# Patient Record
Sex: MALE | Race: BLACK OR AFRICAN AMERICAN | Employment: FULL TIME | URBAN - METROPOLITAN AREA
[De-identification: names, ages, dates, MRNs, and addresses within clinical notes are randomized per-mention and may not be internally consistent; named-entity substitution may affect disease eponyms.]

---

## 2018-01-10 ENCOUNTER — OFFICE VISIT (OUTPATIENT)
Dept: INTERNAL MEDICINE CLINIC | Age: 43
End: 2018-01-10

## 2018-01-10 VITALS
HEART RATE: 82 BPM | BODY MASS INDEX: 25.62 KG/M2 | TEMPERATURE: 97.5 F | HEIGHT: 69 IN | WEIGHT: 173 LBS | DIASTOLIC BLOOD PRESSURE: 88 MMHG | OXYGEN SATURATION: 98 % | SYSTOLIC BLOOD PRESSURE: 123 MMHG | RESPIRATION RATE: 20 BRPM

## 2018-01-10 DIAGNOSIS — E11.9 TYPE 2 DIABETES MELLITUS WITHOUT COMPLICATION, WITHOUT LONG-TERM CURRENT USE OF INSULIN (HCC): ICD-10-CM

## 2018-01-10 DIAGNOSIS — Z23 ENCOUNTER FOR IMMUNIZATION: ICD-10-CM

## 2018-01-10 DIAGNOSIS — F51.04 CHRONIC INSOMNIA: ICD-10-CM

## 2018-01-10 DIAGNOSIS — Z86.69 HISTORY OF SEIZURE DISORDER: ICD-10-CM

## 2018-01-10 DIAGNOSIS — Z00.00 WELL ADULT EXAM: Primary | ICD-10-CM

## 2018-01-10 LAB
GLUCOSE POC: 85 MG/DL
HBA1C MFR BLD HPLC: 11.2 %

## 2018-01-10 RX ORDER — INSULIN PUMP SYRINGE, 3 ML
EACH MISCELLANEOUS
Qty: 1 KIT | Refills: 0 | Status: SHIPPED | OUTPATIENT
Start: 2018-01-10 | End: 2018-03-16 | Stop reason: SDUPTHER

## 2018-01-10 RX ORDER — METFORMIN HYDROCHLORIDE 1000 MG/1
1000 TABLET ORAL 2 TIMES DAILY WITH MEALS
Qty: 180 TAB | Refills: 3 | Status: SHIPPED | OUTPATIENT
Start: 2018-01-10 | End: 2018-11-01 | Stop reason: SDUPTHER

## 2018-01-10 RX ORDER — METFORMIN HYDROCHLORIDE 1000 MG/1
1000 TABLET ORAL 2 TIMES DAILY WITH MEALS
Qty: 60 TAB | Refills: 0 | Status: SHIPPED | OUTPATIENT
Start: 2018-01-10 | End: 2018-01-10 | Stop reason: SDUPTHER

## 2018-01-10 RX ORDER — GUAIFENESIN 100 MG/5ML
81 LIQUID (ML) ORAL DAILY
Qty: 30 TAB | Refills: 0 | Status: SHIPPED | OUTPATIENT
Start: 2018-01-10 | End: 2018-04-13 | Stop reason: SDUPTHER

## 2018-01-10 NOTE — PATIENT INSTRUCTIONS
Insomnia: Care Instructions  Your Care Instructions    Insomnia is the inability to sleep well. It is a common problem for most people at some time. Insomnia may make it hard for you to get to sleep, stay asleep, or sleep as long as you need to. This can make you tired and grouchy during the day. It can also make you forgetful, less effective at work, and unhappy. Insomnia can be caused by conditions such as depression or anxiety. Pain can also affect your ability to sleep. When these problems are solved, the insomnia usually clears up. But sometimes bad sleep habits can cause insomnia. If insomnia is affecting your work or your enjoyment of life, you can take steps to improve your sleep. Follow-up care is a key part of your treatment and safety. Be sure to make and go to all appointments, and call your doctor if you are having problems. It's also a good idea to know your test results and keep a list of the medicines you take. How can you care for yourself at home? What to avoid  · Do not have drinks with caffeine, such as coffee or black tea, for 8 hours before bed. · Do not smoke or use other types of tobacco near bedtime. Nicotine is a stimulant and can keep you awake. · Avoid drinking alcohol late in the evening, because it can cause you to wake in the middle of the night. · Do not eat a big meal close to bedtime. If you are hungry, eat a light snack. · Do not drink a lot of water close to bedtime, because the need to urinate may wake you up during the night. · Do not read or watch TV in bed. Use the bed only for sleeping and sexual activity. What to try  · Go to bed at the same time every night, and wake up at the same time every morning. Do not take naps during the day. · Keep your bedroom quiet, dark, and cool. · Sleep on a comfortable pillow and mattress. · If watching the clock makes you anxious, turn it facing away from you so you cannot see the time.   · If you worry when you lie down, start a worry book. Well before bedtime, write down your worries, and then set the book and your concerns aside. · Try meditation or other relaxation techniques before you go to bed. · If you cannot fall asleep, get up and go to another room until you feel sleepy. Do something relaxing. Repeat your bedtime routine before you go to bed again. · Make your house quiet and calm about an hour before bedtime. Turn down the lights, turn off the TV, log off the computer, and turn down the volume on music. This can help you relax after a busy day. When should you call for help? Watch closely for changes in your health, and be sure to contact your doctor if:  ? · Your efforts to improve your sleep do not work. ? · Your insomnia gets worse. ? · You have been feeling down, depressed, or hopeless or have lost interest in things that you usually enjoy. Where can you learn more? Go to http://brianSwitchable Solutionsjaya.info/. Enter P513 in the search box to learn more about \"Insomnia: Care Instructions. \"  Current as of: July 26, 2016  Content Version: 11.4  © 1976-2082 [employer]. Care instructions adapted under license by [employer] (which disclaims liability or warranty for this information). If you have questions about a medical condition or this instruction, always ask your healthcare professional. [address] any warranty or liability for your use of this information. Learning About Benefits From Quitting Smoking  How does quitting smoking make you healthier? If you're thinking about quitting smoking, you may have a few reasons to be smoke-free. Your health may be one of them. · When you quit smoking, you lower your risks for cancer, lung disease, heart attack, stroke, blood vessel disease, and blindness from macular degeneration. · When you're smoke-free, you get sick less often, and you heal faster.  You are less likely to get colds, flu, bronchitis, and pneumonia. · As a nonsmoker, you may find that your mood is better and you are less stressed. When and how will you feel healthier? Quitting has real health benefits that start from day 1 of being smoke-free. And the longer you stay smoke-free, the healthier you get and the better you feel. The first hours  · After just 20 minutes, your blood pressure and heart rate go down. That means there's less stress on your heart and blood vessels. · Within 12 hours, the level of carbon monoxide in your blood drops back to normal. That makes room for more oxygen. With more oxygen in your body, you may notice that you have more energy than when you smoked. After 2 weeks  · Your lungs start to work better. · Your risk of heart attack starts to drop. After 1 month  · When your lungs are clear, you cough less and breathe deeper, so it's easier to be active. · Your sense of taste and smell return. That means you can enjoy food more than you have since you started smoking. Over the years  · After 1 year, your risk of heart disease is half what it would be if you kept smoking. · After 5 years, your risk of stroke starts to shrink. Within a few years after that, it's about the same as if you'd never smoked. · After 10 years, your risk of dying from lung cancer is cut by about half. And your risk for many other types of cancer is lower too. How would quitting help others in your life? When you quit smoking, you improve the health of everyone who now breathes in your smoke. · Their heart, lung, and cancer risks drop, much like yours. · They are sick less. For babies and small children, living smoke-free means they're less likely to have ear infections, pneumonia, and bronchitis. · If you're a woman who is or will be pregnant someday, quitting smoking means a healthier . · Children who are close to you are less likely to become adult smokers. Where can you learn more?   Go to http://douglas.info/. Enter [id number] [id number] [id number] in the search box to learn more about \"Learning About Benefits From Quitting Smoking. \"  Current as of: March 20, 2017  Content Version: 11.4  © 1663-2622 Healthwise[employer]. Care instructions adapted under license by [employer] (which disclaims liability or warranty for this information). If you have questions about a medical condition or this instruction, always ask your healthcare professional. [place] [address] any warranty or liability for your use of this information.

## 2018-01-10 NOTE — PROGRESS NOTES
1. Have you been to the ER, urgent care clinic since your last visit? Hospitalized since your last visit? No.    2. Have you seen or consulted any other health care providers outside of the [address] [place] [address] since your last visit? Include any pap smears or colon screening. No.  Blood glucose/A1C/Lipidn verbal order DR. Khanna/STEPHON Mayo. Angel Ramos is a 40 y.o. male who presents for routine immunizations. He denies any symptoms , reactions or allergies that would exclude them from being immunized today. Risks and adverse reactions were discussed and the VIS was given to them. All questions were addressed. He was observed for 15 min post injection. There were no reactions observed.     Keyla Ramírez LPN

## 2018-01-10 NOTE — PROGRESS NOTES
Jeff Gottlieb is a 40 y.o. male and presents with Diabetes  . Subjective:    First visit w me. Former Dr. Kathie Werner pts  Last visit in this office 2 years ago. Booked for \"follow-up\". PMH:  Diabetes Mellitus Review:  He has diabetes mellitus. Diabetic ROS - medication compliance: pt has been out of his medication for some time. Pt has no glucometer  Known diabetic complications: none  Cardiovascular risk factors: family history, dyslipidemia, diabetes mellitus,  hypertension  Current diabetic medications include oral agents  Eye exam current (within one year): no  Weight trend: stable  Prior visit with dietician: no  Current diet: \"healthy\" diet  in general  Current exercise: walking  Current monitoring regimen: home blood tests - daily  Home blood sugar records: trend: stable  Any episodes of hypoglycemia? no  Is He on ACE inhibitor or angiotensin II receptor blocker? Yes   Lab Results   Component Value Date/Time    Hemoglobin A1c 7.7 03/20/2009 03:20 AM    Hemoglobin A1c (POC) 11.2 01/10/2018 11:00 AM     Pt w c/o chronic insomnia. Pt works nights. He relays he has had a long-standing h/o insomnia w no relief w ambien. He had a sleep study @ [facility] [place], but has no idea of the results. Pt has a h/o seizure d/o was on keppra. Pt was followed by neurology @ [facility] [place]. Pt has been seizure-free OFF MEDS x ~ 4 yrs. Pt is a smoker. Not currently interested in quitting.   Review of Systems  Constitutional: negative for fevers, chills, anorexia and weight loss  Respiratory:  negative for cough, hemoptysis, dyspnea,wheezing  CV:   negative for chest pain, palpitations, lower extremity edema  GI:   negative for nausea, vomiting, diarrhea, abdominal pain,melena  Musculoskel: negative for myalgias, arthralgias, back pain, muscle weakness, joint pain  Neurological:  negative for headaches, dizziness, vertigo, memory problems and gait   Behavl/Psych: negative for feelings of anxiety    Past Medical History:   Diagnosis Date    Asthma     [facility] ([facility] [address]     Kidney stones     Seizures (HCC)      Past Surgical History:   Procedure Laterality Date    HX OTHER SURGICAL      adenoidectomy     Social History     Social History    Marital status: SINGLE     Spouse name: N/A    Number of children: N/A    Years of education: N/A     Social History Main Topics    Smoking status: Current Every Day Smoker     Packs/day: 0.50     Types: Cigarettes    Smokeless tobacco: Current User    Alcohol use Yes      Comment: seldomly    Drug use: No    Sexual activity: Yes     Partners: Female     Other Topics Concern    None     Social History Narrative     Family History   Problem Relation Age of Onset   Philip Canela Stroke Mother     Heart Disease Mother     Diabetes Mother     High Cholesterol Mother     Hypertension Mother     Thyroid Disease Mother     Hypertension Father     High Cholesterol Father     Diabetes Maternal Grandmother     Hypertension Maternal Grandmother     Arthritis-osteo Maternal Grandmother      Current Outpatient Prescriptions   Medication Sig Dispense Refill    metFORMIN (GLUCOPHAGE) 1,000 mg tablet Take 1 Tab by mouth two (2) times daily (with meals). 180 Tab 3    aspirin 81 mg chewable tablet Take 1 Tab by mouth daily. 30 Tab 0    glucose blood VI test strips (BLOOD GLUCOSE TEST) strip Use BID Dx11.9 200 Strip 5    Blood-Glucose Meter (ONETOUCH ULTRAMINI) monitoring kit Dx 11.9 1 Kit 0    omeprazole (PRILOSEC) 20 mg capsule Take 1 Cap by mouth daily. 30 Cap 11    fish oil-omega-3 fatty acids 340-1,000 mg capsule Take 1 Cap by mouth daily. 30 Cap 11    tamsulosin (FLOMAX) 0.4 mg capsule Take 1 Cap by mouth daily.  To improve urination  Indications: SYMPTOMATIC BENIGN PROSTATIC HYPERPLASIA 30 Cap 2     Allergies   Allergen Reactions    Dilantin Infatabs [Phenytoin] Unknown (comments)       Objective:  Visit Vitals    /88 (BP 1 Location: Left arm, BP Patient Position: Sitting)    Pulse 82    Temp 97.5 °F (36.4 °C) (Oral)    Resp 20    Ht 5' 9\" (1.753 m)    Wt 173 lb (78.5 kg)    SpO2 98%    BMI 25.55 kg/m2     Physical Exam:   General appearance - alert, well appearing, and in no distress  Mental status - alert, oriented to person, place, and time  EYE-EOMI  ENT-ENT exam normal, no neck nodes or sinus tenderness  Nose - normal and patent, no erythema, discharge or polyps  Mouth - mucous membranes moist, pharynx normal without lesions  Neck - supple, no significant adenopathy   Chest - clear to auscultation, no wheezes, rales or rhonchi, symmetric air entry   Heart - normal rate, regular rhythm, normal S1, S2  Abdomen - soft, nontender, nondistended, no masses or organomegaly  Ext-peripheral pulses normal, no pedal edema, no clubbing or cyanosis  Skin-Warm and dry. no hyperpigmentation, vitiligo, or suspicious lesions  Neuro -alert, oriented, normal speech, no focal findings or movement disorder noted      Results for orders placed or performed in visit on 01/10/18   AMB POC GLUCOSE BLOOD, BY GLUCOSE MONITORING DEVICE   Result Value Ref Range    Glucose POC 85 mg/dL   AMB POC HEMOGLOBIN A1C   Result Value Ref Range    Hemoglobin A1c (POC) 11.2 %       Assessment/Plan:    ICD-10-CM ICD-9-CM    1. Well adult exam Z00.00 V70.0 LIPID PANEL      METABOLIC PANEL, COMPREHENSIVE      MICROALBUMIN, UR, RAND      CBC W/O DIFF      PSA, DIAGNOSTIC (PROSTATE SPECIFIC AG)   2.  Type 2 diabetes mellitus without complication, without long-term current use of insulin ([facility]) E11.9 250.00 AMB POC GLUCOSE BLOOD, BY GLUCOSE MONITORING DEVICE      AMB POC HEMOGLOBIN A1C      metFORMIN (GLUCOPHAGE) 1,000 mg tablet      REFERRAL TO OPHTHALMOLOGY      LIPID PANEL      METABOLIC PANEL, COMPREHENSIVE      MICROALBUMIN, UR, RAND      CBC W/O DIFF      PSA, DIAGNOSTIC (PROSTATE SPECIFIC AG)      glucose blood VI test strips (BLOOD GLUCOSE TEST) strip      DISCONTINUED: metFORMIN (GLUCOPHAGE) 1,000 mg tablet      CANCELED: AMB POC LIPID PROFILE   3. History of seizure disorder Z86.69 V12.49    4. Chronic insomnia F51.04 780.52    5. Encounter for immunization Z23 V03.89 PNEUMOCOCCAL POLYSACCHARIDE VACCINE, 23-VALENT, ADULT OR IMMUNOSUPPRESSED PT DOSE,      INFLUENZA VIRUS VAC QUAD,SPLIT,PRESV FREE SYRINGE IM     Orders Placed This Encounter    PNEUMOCOCCAL POLYSACCHARIDE VACCINE, 23-VALENT, ADULT OR IMMUNOSUPPRESSED PT DOSE,    INFLUENZA VIRUS VACCINE QUADRIVALENT, PRESERVATIVE FREE SYRINGE ([id number])    LIPID PANEL    METABOLIC PANEL, COMPREHENSIVE    MICROALBUMIN, UR, RAND    CBC W/O DIFF    PROSTATE SPECIFIC AG    REFERRAL TO OPHTHALMOLOGY     Referral Priority:   Routine     Referral Type:   Consultation     Referral Reason:   Specialty Services Required     Referral Location:   Wang Herndon M.D. Referred to Provider:   Wang Herndon MD     Requested Specialty:   Ophthalmology    AMB POC GLUCOSE BLOOD, BY GLUCOSE MONITORING DEVICE    AMB POC HEMOGLOBIN A1C    DISCONTD: metFORMIN (GLUCOPHAGE) 1,000 mg tablet     Sig: Take 1 Tab by mouth two (2) times daily (with meals). Dispense:  60 Tab     Refill:  0    metFORMIN (GLUCOPHAGE) 1,000 mg tablet     Sig: Take 1 Tab by mouth two (2) times daily (with meals). Dispense:  180 Tab     Refill:  3    aspirin 81 mg chewable tablet     Sig: Take 1 Tab by mouth daily. Dispense:  30 Tab     Refill:  0    glucose blood VI test strips (BLOOD GLUCOSE TEST) strip     Sig: Use BID Dx11.9     Dispense:  200 Strip     Refill:  5    Blood-Glucose Meter (ONETOUCH ULTRAMINI) monitoring kit     Sig: Dx 11.9     Dispense:  1 Kit     Refill:  0   refill metformin  stop smoking (advice and handout given), routine labs ordered  ,Take 81mg aspirin daily   Review [facility] chart  Refill medication  Address insomnia NV  Patient Instructions          Insomnia: Care Instructions  Your Care Instructions    Insomnia is the inability to sleep well.  It is a common problem for most people at some time. Insomnia may make it hard for you to get to sleep, stay asleep, or sleep as long as you need to. This can make you tired and grouchy during the day. It can also make you forgetful, less effective at work, and unhappy. Insomnia can be caused by conditions such as depression or anxiety. Pain can also affect your ability to sleep. When these problems are solved, the insomnia usually clears up. But sometimes bad sleep habits can cause insomnia. If insomnia is affecting your work or your enjoyment of life, you can take steps to improve your sleep. Follow-up care is a key part of your treatment and safety. Be sure to make and go to all appointments, and call your doctor if you are having problems. It's also a good idea to know your test results and keep a list of the medicines you take. How can you care for yourself at home? What to avoid  · Do not have drinks with caffeine, such as coffee or black tea, for 8 hours before bed. · Do not smoke or use other types of tobacco near bedtime. Nicotine is a stimulant and can keep you awake. · Avoid drinking alcohol late in the evening, because it can cause you to wake in the middle of the night. · Do not eat a big meal close to bedtime. If you are hungry, eat a light snack. · Do not drink a lot of water close to bedtime, because the need to urinate may wake you up during the night. · Do not read or watch TV in bed. Use the bed only for sleeping and sexual activity. What to try  · Go to bed at the same time every night, and wake up at the same time every morning. Do not take naps during the day. · Keep your bedroom quiet, dark, and cool. · Sleep on a comfortable pillow and mattress. · If watching the clock makes you anxious, turn it facing away from you so you cannot see the time. · If you worry when you lie down, start a worry book. Well before bedtime, write down your worries, and then set the book and your concerns aside.   · Try meditation or other relaxation techniques before you go to bed. · If you cannot fall asleep, get up and go to another room until you feel sleepy. Do something relaxing. Repeat your bedtime routine before you go to bed again. · Make your house quiet and calm about an hour before bedtime. Turn down the lights, turn off the TV, log off the computer, and turn down the volume on music. This can help you relax after a busy day. When should you call for help? Watch closely for changes in your health, and be sure to contact your doctor if:  ? · Your efforts to improve your sleep do not work. ? · Your insomnia gets worse. ? · You have been feeling down, depressed, or hopeless or have lost interest in things that you usually enjoy. Where can you learn more? Go to http://brian-jaya.info/. Enter P513 in the search box to learn more about \"Insomnia: Care Instructions. \"  Current as of: July 26, 2016  Content Version: 11.4  © 1659-0244 [employer]. Care instructions adapted under license by [employer] (which disclaims liability or warranty for this information). If you have questions about a medical condition or this instruction, always ask your healthcare professional. [address] any warranty or liability for your use of this information. Learning About Benefits From Quitting Smoking  How does quitting smoking make you healthier? If you're thinking about quitting smoking, you may have a few reasons to be smoke-free. Your health may be one of them. · When you quit smoking, you lower your risks for cancer, lung disease, heart attack, stroke, blood vessel disease, and blindness from macular degeneration. · When you're smoke-free, you get sick less often, and you heal faster. You are less likely to get colds, flu, bronchitis, and pneumonia. · As a nonsmoker, you may find that your mood is better and you are less stressed.   When and how will you feel healthier? Quitting has real health benefits that start from day 1 of being smoke-free. And the longer you stay smoke-free, the healthier you get and the better you feel. The first hours  · After just 20 minutes, your blood pressure and heart rate go down. That means there's less stress on your heart and blood vessels. · Within 12 hours, the level of carbon monoxide in your blood drops back to normal. That makes room for more oxygen. With more oxygen in your body, you may notice that you have more energy than when you smoked. After 2 weeks  · Your lungs start to work better. · Your risk of heart attack starts to drop. After 1 month  · When your lungs are clear, you cough less and breathe deeper, so it's easier to be active. · Your sense of taste and smell return. That means you can enjoy food more than you have since you started smoking. Over the years  · After 1 year, your risk of heart disease is half what it would be if you kept smoking. · After 5 years, your risk of stroke starts to shrink. Within a few years after that, it's about the same as if you'd never smoked. · After 10 years, your risk of dying from lung cancer is cut by about half. And your risk for many other types of cancer is lower too. How would quitting help others in your life? When you quit smoking, you improve the health of everyone who now breathes in your smoke. · Their heart, lung, and cancer risks drop, much like yours. · They are sick less. For babies and small children, living smoke-free means they're less likely to have ear infections, pneumonia, and bronchitis. · If you're a woman who is or will be pregnant someday, quitting smoking means a healthier . · Children who are close to you are less likely to become adult smokers. Where can you learn more? Go to http://brian-jaya.info/. Enter [id number] in the search box to learn more about \"Learning About Benefits From Quitting Smoking. \"  Current as of: March 20, 2017  Content Version: 11.4  © 8094-4181 [employer], [employer]. Care instructions adapted under license by [employer] (which disclaims liability or warranty for this information). If you have questions about a medical condition or this instruction, always ask your healthcare professional. [address] any warranty or liability for your use of this information. Follow-up Disposition:  Return in about 3 months (around 4/10/2018). I have reviewed with the patient details of the assessment and plan and all questions were answered. Relevent patient education was performed. The most recent lab findings were reviewed with the patient. An After Visit Summary was printed and given to the patient.

## 2018-01-10 NOTE — MR AVS SNAPSHOT
Visit Information Date & Time Provider Department Dept. Phone Encounter #  
 1/10/2018 11:20 AM Jolie Pizano, [address] [id number] Follow-up Instructions Return in about 3 months (around 4/10/2018). Your Appointments 1/10/2018 11:20 AM  
ROUTINE CARE with Jolie Pizano MD  
[facility] Appt Note: follow up on DM and A1C, med refills [place] [address] [facility] [id number]  
[phone]  
  
   
 [place] [address] Upcoming Health Maintenance Date Due Pneumococcal 19-64 Medium Risk (1 of 1 - PPSV23) 11/17/1995 DTaP/Tdap/Td series (1 - Tdap) 11/17/1997 EYE EXAM RETINAL OR DILATED Q1 10/13/2015 HEMOGLOBIN A1C Q6M 6/4/2016 FOOT EXAM Q1 12/4/2016 MICROALBUMIN Q1 12/4/2016 LIPID PANEL Q1 12/4/2016 Influenza Age 5 to Adult 8/1/2017 Allergies as of 1/10/2018  Review Complete On: 1/10/2018 By: Jolie Pizano MD  
  
 Severity Noted Reaction Type Reactions Dilantin Infatabs [Phenytoin]  02/19/2013    Unknown (comments) Current Immunizations  Reviewed on 10/13/2014 Name Date Influenza Vaccine (Quad) PF  Incomplete Influenza Vaccine Intradermal PF 10/13/2014 Pneumococcal Polysaccharide (PPSV-23)  Incomplete Not reviewed this visit You Were Diagnosed With   
  
 Codes Comments Well adult exam    -  Primary ICD-10-CM: Z00.00 ICD-9-CM: V70.0 Type 2 diabetes mellitus without complication, without long-term current use of insulin (HCC)     ICD-10-CM: E11.9 ICD-9-CM: 250.00 History of seizure disorder     ICD-10-CM: Z86.69 
ICD-9-CM: V12.49 Chronic insomnia     ICD-10-CM: F51.04 
ICD-9-CM: 780.52 Encounter for immunization     ICD-10-CM: J20 ICD-9-CM: V03.89 Vitals BP Pulse Temp Resp Height(growth percentile) Weight(growth percentile) 123/88 (BP 1 Location: Left arm, BP Patient Position: Sitting) 82 97.5 °F (36.4 °C) (Oral) 20 5' 9\" (1.753 m) 173 lb (78.5 kg) SpO2 BMI Smoking Status 98% 25.55 kg/m2 Current Every Day Smoker Vitals History BMI and BSA Data Body Mass Index Body Surface Area 25.55 kg/m 2 1.95 m 2 Preferred Pharmacy Pharmacy Name Phone [address] [facility] [phone] Your Updated Medication List  
  
   
This list is accurate as of: 1/10/18 11:17 AM.  Always use your most recent med list.  
  
  
  
  
 aspirin 81 mg chewable tablet Take 1 Tab by mouth daily. Blood-Glucose Meter monitoring kit Commonly known as:  Kristin Johnson Dx 11.9 fish oil-omega-3 fatty acids 340-1,000 mg capsule Take 1 Cap by mouth daily. fluconazole 150 mg tablet Commonly known as:  DIFLUCAN  
1 pill weekly for 6 weeks  
  
 glucose blood VI test strips strip Commonly known as:  blood glucose test  
Use BID Dx11.9  
  
 metFORMIN 1,000 mg tablet Commonly known as:  GLUCOPHAGE Take 1 Tab by mouth two (2) times daily (with meals). omeprazole 20 mg capsule Commonly known as:  PRILOSEC Take 1 Cap by mouth daily. tamsulosin 0.4 mg capsule Commonly known as:  FLOMAX Take 1 Cap by mouth daily. To improve urination  Indications: SYMPTOMATIC BENIGN PROSTATIC HYPERPLASIA Prescriptions Sent to Pharmacy Refills  
 metFORMIN (GLUCOPHAGE) 1,000 mg tablet 3 Sig: Take 1 Tab by mouth two (2) times daily (with meals). Class: Normal  
 Pharmacy: [address], [address] Ph #: [phone] Route: Oral  
 aspirin 81 mg chewable tablet 0 Sig: Take 1 Tab by mouth daily. Class: Normal  
 Pharmacy: [address], [address] Ph #: [phone]  Route: Oral  
 glucose blood VI test strips (BLOOD GLUCOSE TEST) strip 5 Sig: Use BID Dx11.9 Class: Normal  
 Pharmacy: [address], [address] Ph #: [phone] Blood-Glucose Meter (ONETOUCH ULTRAMINI) monitoring kit 0 Sig: Dx 11.9 Class: Normal  
 Pharmacy: [address], [address] Ph #: [phone] We Performed the Following AMB POC GLUCOSE BLOOD, BY GLUCOSE MONITORING DEVICE [21263 CPT(R)] AMB POC HEMOGLOBIN A1C [87790 CPT(R)] AMB POC LIPID PROFILE [42169 CPT(R)] CBC W/O DIFF [23796 CPT(R)] INFLUENZA VIRUS VAC QUAD,SPLIT,PRESV FREE SYRINGE IM [id number] CPT(R)] LIPID PANEL [02749 CPT(R)] METABOLIC PANEL, COMPREHENSIVE [45568 CPT(R)] MICROALBUMIN, UR, RAND [id number] CPT(R)] PNEUMOCOCCAL POLYSACCHARIDE VACCINE, 23-VALENT, ADULT OR IMMUNOSUPPRESSED PT DOSE, [75576 CPT(R)] PSA, DIAGNOSTIC (PROSTATE SPECIFIC AG) [id number] CPT(R)] REFERRAL TO OPHTHALMOLOGY [REF57 Custom] Comments:  
 Dr. Riley [place] [address] 
[phone] Follow-up Instructions Return in about 3 months (around 4/10/2018). Referral Information Referral ID Referred By Referred To  
  
 [id number] Harvey Vincent M.D.   
   [facility] [address] [place], [address] [facility] Visits Status Start Date End Date 1 New Request 1/10/18 1/10/19 If your referral has a status of pending review or denied, additional information will be sent to support the outcome of this decision. Patient Instructions Insomnia: Care Instructions Your Care Instructions Insomnia is the inability to sleep well. It is a common problem for most people at some time. Insomnia may make it hard for you to get to sleep, stay asleep, or sleep as long as you need to.  This can make you tired and grouchy during the day. It can also make you forgetful, less effective at work, and unhappy. Insomnia can be caused by conditions such as depression or anxiety. Pain can also affect your ability to sleep. When these problems are solved, the insomnia usually clears up. But sometimes bad sleep habits can cause insomnia. If insomnia is affecting your work or your enjoyment of life, you can take steps to improve your sleep. Follow-up care is a key part of your treatment and safety. Be sure to make and go to all appointments, and call your doctor if you are having problems. It's also a good idea to know your test results and keep a list of the medicines you take. How can you care for yourself at home? What to avoid · Do not have drinks with caffeine, such as coffee or black tea, for 8 hours before bed. · Do not smoke or use other types of tobacco near bedtime. Nicotine is a stimulant and can keep you awake. · Avoid drinking alcohol late in the evening, because it can cause you to wake in the middle of the night. · Do not eat a big meal close to bedtime. If you are hungry, eat a light snack. · Do not drink a lot of water close to bedtime, because the need to urinate may wake you up during the night. · Do not read or watch TV in bed. Use the bed only for sleeping and sexual activity. What to try · Go to bed at the same time every night, and wake up at the same time every morning. Do not take naps during the day. · Keep your bedroom quiet, dark, and cool. · Sleep on a comfortable pillow and mattress. · If watching the clock makes you anxious, turn it facing away from you so you cannot see the time. · If you worry when you lie down, start a worry book. Well before bedtime, write down your worries, and then set the book and your concerns aside. · Try meditation or other relaxation techniques before you go to bed.  
· If you cannot fall asleep, get up and go to another room until you feel sleepy. Do something relaxing. Repeat your bedtime routine before you go to bed again. · Make your house quiet and calm about an hour before bedtime. Turn down the lights, turn off the TV, log off the computer, and turn down the volume on music. This can help you relax after a busy day. When should you call for help? Watch closely for changes in your health, and be sure to contact your doctor if: 
? · Your efforts to improve your sleep do not work. ? · Your insomnia gets worse. ? · You have been feeling down, depressed, or hopeless or have lost interest in things that you usually enjoy. Where can you learn more? Go to http://brian-jaya.info/. Enter P513 in the search box to learn more about \"Insomnia: Care Instructions. \" Current as of: July 26, 2016 Content Version: 11.4 © 4590-3656 [employer]. Care instructions adapted under license by "[employer] (which disclaims liability or warranty for this information). If you have questions about a medical condition or this instruction, always ask your healthcare professional. [address] any warranty or liability for your use of this information. Learning About Benefits From Quitting Smoking How does quitting smoking make you healthier? If you're thinking about quitting smoking, you may have a few reasons to be smoke-free. Your health may be one of them. · When you quit smoking, you lower your risks for cancer, lung disease, heart attack, stroke, blood vessel disease, and blindness from macular degeneration. · When you're smoke-free, you get sick less often, and you heal faster. You are less likely to get colds, flu, bronchitis, and pneumonia. · As a nonsmoker, you may find that your mood is better and you are less stressed. When and how will you feel healthier?  
Quitting has real health benefits that start from day 1 of being smoke-free. And the longer you stay smoke-free, the healthier you get and the better you feel. The first hours · After just 20 minutes, your blood pressure and heart rate go down. That means there's less stress on your heart and blood vessels. · Within 12 hours, the level of carbon monoxide in your blood drops back to normal. That makes room for more oxygen. With more oxygen in your body, you may notice that you have more energy than when you smoked. After 2 weeks · Your lungs start to work better. · Your risk of heart attack starts to drop. After 1 month · When your lungs are clear, you cough less and breathe deeper, so it's easier to be active. · Your sense of taste and smell return. That means you can enjoy food more than you have since you started smoking. Over the years · After 1 year, your risk of heart disease is half what it would be if you kept smoking. · After 5 years, your risk of stroke starts to shrink. Within a few years after that, it's about the same as if you'd never smoked. · After 10 years, your risk of dying from lung cancer is cut by about half. And your risk for many other types of cancer is lower too. How would quitting help others in your life? When you quit smoking, you improve the health of everyone who now breathes in your smoke. · Their heart, lung, and cancer risks drop, much like yours. · They are sick less. For babies and small children, living smoke-free means they're less likely to have ear infections, pneumonia, and bronchitis. · If you're a woman who is or will be pregnant someday, quitting smoking means a healthier . · Children who are close to you are less likely to become adult smokers. Where can you learn more? Go to http://brian-jaya.info/. Enter [id number] [id number] in the search box to learn more about \"Learning About Benefits From Quitting Smoking. \" Current as of: 2017 Content Version: 11.4 © 2345-8748 [employer]. Care instructions adapted under license by [employer] (which disclaims liability or warranty for this information). If you have questions about a medical condition or this instruction, always ask your healthcare professional. [address] any warranty or liability for your use of this information. Introducing [address]! Miah Lock introduces Qwilr patient portal. Now you can access parts of your medical record, email your doctor's office, and request medication refills online. 1. In your internet browser, go to [email or web address]/[email or web address] 2. Click on the First Time User? Click Here link in the Sign In box. You will see the New Member Sign Up page. 3. Enter your Qwilr Access Code exactly as it appears below. You will not need to use this code after youve completed the sign-up process. If you do not sign up before the expiration date, you must request a new code. · Qwilr Access Code: [email or web address] Expires: 4/10/2018 10:29 AM 
 
4. Enter the last four digits of your Social Security Number (xxxx) and Date of Birth (mm/dd/yyyy) as indicated and click Submit. You will be taken to the next sign-up page. 5. Create a Qwilr ID. This will be your Qwilr login ID and cannot be changed, so think of one that is secure and easy to remember. 6. Create a Qwilr password. You can change your password at any time. 7. Enter your Password Reset Question and Answer. This can be used at a later time if you forget your password. 8. Enter your e-mail address. You will receive e-mail notification when new information is available in [address] 9. Click Sign Up. You can now view and download portions of your medical record. 10. Click the Download Summary menu link to download a portable copy of your medical information.  
 
If you have questions, please visit the Frequently Asked Questions section of the [employer]. Remember, [employer] is NOT to be used for urgent needs. For medical emergencies, dial 911. Now available from your iPhone and Android! Please provide this summary of care documentation to your next provider. Your primary care clinician is listed as Teri Grayson. If you have any questions after today's visit, please call [phone].

## 2018-01-11 LAB
ALBUMIN SERPL-MCNC: 4.4 G/DL (ref 3.5–5.5)
ALBUMIN/GLOB SERPL: 1.5 {RATIO} (ref 1.2–2.2)
ALP SERPL-CCNC: 113 IU/L (ref 39–117)
ALT SERPL-CCNC: 32 IU/L (ref 0–44)
AST SERPL-CCNC: 23 IU/L (ref 0–40)
BILIRUB SERPL-MCNC: 0.5 MG/DL (ref 0–1.2)
BUN SERPL-MCNC: 10 MG/DL (ref 6–24)
BUN/CREAT SERPL: 11 (ref 9–20)
CALCIUM SERPL-MCNC: 9.6 MG/DL (ref 8.7–10.2)
CHLORIDE SERPL-SCNC: 94 MMOL/L (ref 96–106)
CHOLEST SERPL-MCNC: 208 MG/DL (ref 100–199)
CO2 SERPL-SCNC: 26 MMOL/L (ref 18–29)
CREAT SERPL-MCNC: 0.91 MG/DL (ref 0.76–1.27)
ERYTHROCYTE [DISTWIDTH] IN BLOOD BY AUTOMATED COUNT: 13.5 % (ref 12.3–15.4)
GLOBULIN SER CALC-MCNC: 2.9 G/DL (ref 1.5–4.5)
GLUCOSE SERPL-MCNC: 270 MG/DL (ref 65–99)
HCT VFR BLD AUTO: 45.8 % (ref 37.5–51)
HDLC SERPL-MCNC: 43 MG/DL
HGB BLD-MCNC: 15.7 G/DL (ref 13–17.7)
INTERPRETATION, 910389: NORMAL
LDLC SERPL CALC-MCNC: 151 MG/DL (ref 0–99)
Lab: NORMAL
MCH RBC QN AUTO: 31.2 PG (ref 26.6–33)
MCHC RBC AUTO-ENTMCNC: 34.3 G/DL (ref 31.5–35.7)
MCV RBC AUTO: 91 FL (ref 79–97)
MICROALBUMIN UR-MCNC: 15.7 UG/ML
PLATELET # BLD AUTO: 329 X10E3/UL (ref 150–379)
POTASSIUM SERPL-SCNC: 4.2 MMOL/L (ref 3.5–5.2)
PROT SERPL-MCNC: 7.3 G/DL (ref 6–8.5)
PSA SERPL-MCNC: 0.5 NG/ML (ref 0–4)
RBC # BLD AUTO: 5.03 X10E6/UL (ref 4.14–5.8)
SODIUM SERPL-SCNC: 137 MMOL/L (ref 134–144)
TRIGL SERPL-MCNC: 68 MG/DL (ref 0–149)
VLDLC SERPL CALC-MCNC: 14 MG/DL (ref 5–40)
WBC # BLD AUTO: 7 X10E3/UL (ref 3.4–10.8)

## 2018-03-16 ENCOUNTER — OFFICE VISIT (OUTPATIENT)
Dept: INTERNAL MEDICINE CLINIC | Age: 43
End: 2018-03-16

## 2018-03-16 VITALS
HEIGHT: 69 IN | BODY MASS INDEX: 25.52 KG/M2 | SYSTOLIC BLOOD PRESSURE: 124 MMHG | HEART RATE: 102 BPM | TEMPERATURE: 98.6 F | OXYGEN SATURATION: 94 % | WEIGHT: 172.3 LBS | RESPIRATION RATE: 18 BRPM | DIASTOLIC BLOOD PRESSURE: 84 MMHG

## 2018-03-16 DIAGNOSIS — R10.9 FLANK PAIN: ICD-10-CM

## 2018-03-16 DIAGNOSIS — J06.9 VIRAL UPPER RESPIRATORY ILLNESS: Primary | ICD-10-CM

## 2018-03-16 DIAGNOSIS — E11.9 TYPE 2 DIABETES MELLITUS WITHOUT COMPLICATION, WITHOUT LONG-TERM CURRENT USE OF INSULIN (HCC): ICD-10-CM

## 2018-03-16 RX ORDER — LANCETS
EACH MISCELLANEOUS
Qty: 200 EACH | Refills: 5 | Status: SHIPPED | OUTPATIENT
Start: 2018-03-16

## 2018-03-16 RX ORDER — HYDROCODONE BITARTRATE AND ACETAMINOPHEN 5; 325 MG/1; MG/1
TABLET ORAL
COMMUNITY
Start: 2018-01-16 | End: 2018-03-16

## 2018-03-16 RX ORDER — INSULIN PUMP SYRINGE, 3 ML
EACH MISCELLANEOUS
Qty: 1 KIT | Refills: 0 | Status: SHIPPED | OUTPATIENT
Start: 2018-03-16 | End: 2019-04-15

## 2018-03-16 NOTE — PROGRESS NOTES
Pt is here for   Chief Complaint   Patient presents with   Rivas Roberts  [facility] for Virus 3/14/18, pt states he wasd told it wasnt the flu    Generalized Body Aches     pt states he still has body aches, sore to the touch, but mostly lower back pains and kidney area pains    New Order     pt states that he would [facility] a order for a meter, lancets, and test strips         Pt states pain level is a 9 all over body pain, and flank pains    1. Have you been to the ER, urgent care clinic since your last visit? Hospitalized since your last visit? No    2. Have you seen or consulted any other health care providers outside of the [address] [place] [address] since your last visit? Include any pap smears or colon screening.  No

## 2018-03-16 NOTE — PROGRESS NOTES
Willem Fernandez is a 40 y.o. male and presents with ED Follow-up [facility] for Virus 3/14/18, pt states he wasd told it wasnt the flu); Generalized Body Aches (pt states he still has body aches, sore to the touch, but mostly lower back pains and kidney area pains); and New Order (pt states that he would [facility] a order for a meter, lancets, and test strips)  . Subjective:    Pt was seen @ [place] ED 2-3 days ago for flu-like sxs. Pt was dx'ed w viral syndrome--otc symptomatic treatment only. Pt comes-in w c/o anjel lbp and is concerned re: kidneys. But pt cannot produce urine to check  Results for orders placed or performed in visit on 12/04/15   AMB POC URINALYSIS DIP STICK AUTO W/ MICRO     Status: None   Result Value Ref Range Status    Color (UA POC) Lilibeth  Final    Clarity (UA POC) Clear  Final    Glucose (UA POC) 1+ Negative Final    Bilirubin (UA POC) Negative Negative Final    Ketones (UA POC) Negative Negative Final    Specific gravity (UA POC) 1.020 1.001 - 1.035 Final    Blood (UA POC) Trace Negative Final     Comment: intact    pH (UA POC) 6.5 4.6 - 8.0 Final    Protein (UA POC) 1+ Negative mg/dL Final    Urobilinogen (UA POC) 1 mg/dL 0.2 - 1 Final    Nitrites (UA POC) Negative Negative Final    Leukocyte esterase (UA POC) Negative Negative Final                 Review of Systems  Constitutional: positive for fevers, chills, anorexia   Eyes:   negative for visual disturbance and irritation  ENT:   negative for tinnitus,sore throat,nasal congestion,ear pains. hoarseness  Respiratory:  negative for cough, hemoptysis, dyspnea,wheezing  CV:   negative for chest pain, palpitations, lower extremity edema  GI:   positive for nausea, vomiting, diarrhea, abdominal pain  Endo:               negative for polyuria,polydipsia,polyphagia,heat intolerance  Genitourinary: negative for frequency, dysuria and hematuria  Musculoskel: positive for myalgias, arthralgias, back pain, muscle weakness, joint pain Neurological:  negative for headaches, dizziness, vertigo, memory problems and gait   Behavl/Psych: negative for feelings of anxiety, depression, mood changes    Past Medical History:   Diagnosis Date    Asthma     Diabetes ([facility]     Kidney stones     Seizures ([facility]      Past Surgical History:   Procedure Laterality Date    HX OTHER SURGICAL      adenoidectomy     Social History     Social History    Marital status: SINGLE     Spouse name: N/A    Number of children: N/A    Years of education: N/A     Social History Main Topics    Smoking status: Current Every Day Smoker     Packs/day: 0.50     Types: Cigarettes    Smokeless tobacco: Current User    Alcohol use Yes      Comment: seldomly    Drug use: No    Sexual activity: Yes     Partners: Female     Other Topics Concern    None     Social History Narrative     Family History   Problem Relation Age of Onset    Stroke Mother     Heart Disease Mother     Diabetes Mother     High Cholesterol Mother     Hypertension Mother     Thyroid Disease Mother     Hypertension Father     High Cholesterol Father     Diabetes Maternal Grandmother     Hypertension Maternal Grandmother     Arthritis-osteo Maternal Grandmother      Current Outpatient Prescriptions   Medication Sig Dispense Refill    Blood-Glucose Meter (ONETOUCH ULTRAMINI) monitoring kit Dx E11.9 1 Kit 0    glucose blood VI test strips (BLOOD GLUCOSE TEST) strip Use BID DxE11.9 200 Strip 5    Lancets misc Use as directed. Dx: E11.9 200 Each 5    metFORMIN (GLUCOPHAGE) 1,000 mg tablet Take 1 Tab by mouth two (2) times daily (with meals). 180 Tab 3    aspirin 81 mg chewable tablet Take 1 Tab by mouth daily. 30 Tab 0    omeprazole (PRILOSEC) 20 mg capsule Take 1 Cap by mouth daily. 30 Cap 11    fish oil-omega-3 fatty acids 340-1,000 mg capsule Take 1 Cap by mouth daily. 30 Cap 11    tamsulosin (FLOMAX) 0.4 mg capsule Take 1 Cap by mouth daily.  To improve urination  Indications: SYMPTOMATIC BENIGN PROSTATIC HYPERPLASIA 30 Cap 2     Allergies   Allergen Reactions    Dilantin Infatabs [Phenytoin] Unknown (comments)       Objective:  Visit Vitals    /84 (BP 1 Location: Right arm, BP Patient Position: Sitting)    Pulse (!) 102    Temp 98.6 °F (37 °C) (Oral)    Resp 18    Ht 5' 9\" (1.753 m)    Wt 172 lb 4.8 oz (78.2 kg)    SpO2 94%    BMI 25.44 kg/m2     Physical Exam:   General appearance - alert, appears tired,nasal voice due to congestion  Mental status - alert, oriented to person, place, and time  Mouth - mucous membranes moist, pharynx normal without lesions  Neck - supple, no significant adenopathy   Chest - clear to auscultation, no wheezes, rales or rhonchi, symmetric air entry   Heart - normal rate, regular rhythm, normal S1, S2  Abdomen - soft, nontender, nondistended, no masses or organomegaly  Ext-peripheral pulses normal, no pedal edema, no clubbing or cyanosis  Back-+ paraspinal tenderness  Skin-Warm and dry. no hyperpigmentation, vitiligo, or suspicious lesions  Neuro -alert, oriented, normal speech, no focal findings or movement disorder noted        Results for orders placed or performed in visit on 11/65/47   METABOLIC PANEL, COMPREHENSIVE   Result Value Ref Range    Glucose 270 (H) 65 - 99 mg/dL    BUN 10 6 - 24 mg/dL    Creatinine 0.91 0.76 - 1.27 mg/dL    GFR est non- >59 mL/min/1.73    GFR est  >59 mL/min/1.73    BUN/Creatinine ratio 11 9 - 20    Sodium 137 134 - 144 mmol/L    Potassium 4.2 3.5 - 5.2 mmol/L    Chloride 94 (L) 96 - 106 mmol/L    CO2 26 18 - 29 mmol/L    Calcium 9.6 8.7 - 10.2 mg/dL    Protein, total 7.3 6.0 - 8.5 g/dL    Albumin 4.4 3.5 - 5.5 g/dL    GLOBULIN, TOTAL 2.9 1.5 - 4.5 g/dL    A-G Ratio 1.5 1.2 - 2.2    Bilirubin, total 0.5 0.0 - 1.2 mg/dL    Alk.  phosphatase 113 39 - 117 IU/L    AST (SGOT) 23 0 - 40 IU/L    ALT (SGPT) 32 0 - 44 IU/L   CBC W/O DIFF   Result Value Ref Range    WBC 7.0 3.4 - 10.8 x10E3/uL    RBC 5.03 4.14 - 5.80 x10E6/uL    HGB 15.7 13.0 - 17.7 g/dL    HCT 45.8 37.5 - 51.0 %    MCV 91 79 - 97 fL    MCH 31.2 26.6 - 33.0 pg    MCHC 34.3 31.5 - 35.7 g/dL    RDW 13.5 12.3 - 15.4 %    PLATELET 553 858 - 613 x10E3/uL   LIPID PANEL   Result Value Ref Range    Cholesterol, total 208 (H) 100 - 199 mg/dL    Triglyceride 68 0 - 149 mg/dL    HDL Cholesterol 43 >39 mg/dL    VLDL, calculated 14 5 - 40 mg/dL    LDL, calculated 151 (H) 0 - 99 mg/dL   PSA, DIAGNOSTIC (PROSTATE SPECIFIC AG)   Result Value Ref Range    Prostate Specific Ag 0.5 0.0 - 4.0 ng/mL   MICROALBUMIN, UR, RAND   Result Value Ref Range    Microalbumin, urine 15.7 Not Estab. ug/mL   CVD REPORT   Result Value Ref Range    INTERPRETATION Note    DIABETES PATIENT EDUCATION   Result Value Ref Range    PDF Image Not applicable    AMB POC GLUCOSE BLOOD, BY GLUCOSE MONITORING DEVICE   Result Value Ref Range    Glucose POC 85 mg/dL   AMB POC HEMOGLOBIN A1C   Result Value Ref Range    Hemoglobin A1c (POC) 11.2 %       Assessment/Plan:    ICD-10-CM ICD-9-CM    1. Viral upper respiratory illness J06.9 465.9     B97.89     2. Flank pain R10.9 789.09 AMB POC URINALYSIS DIP STICK AUTO W/O MICRO   3. Type 2 diabetes mellitus without complication, without long-term current use of insulin (HCC) E11.9 250.00 Blood-Glucose Meter (ONETOUCH ULTRAMINI) monitoring kit      glucose blood VI test strips (BLOOD GLUCOSE TEST) strip      Lancets misc     Orders Placed This Encounter    AMB POC URINALYSIS DIP STICK AUTO W/O MICRO    DISCONTD: HYDROcodone-acetaminophen (NORCO) 5-325 mg per tablet    Blood-Glucose Meter (ONETOUCH ULTRAMINI) monitoring kit     Sig: Dx E11.9     Dispense:  1 Kit     Refill:  0    glucose blood VI test strips (BLOOD GLUCOSE TEST) strip     Sig: Use BID DxE11.9     Dispense:  200 Strip     Refill:  5    Lancets misc     Sig: Use as directed.  Dx: E11.9     Dispense:  200 Each     Refill:  5     Pt left w/o giving a urine sample  There are no Patient Instructions on file for this visit. Follow-up Disposition:  Return for routine/prn. I have reviewed with the patient details of the assessment and plan and all questions were answered. Relevent patient education was performed. The most recent lab findings were reviewed with the patient. An After Visit Summary was printed and given to the patient.

## 2018-09-11 ENCOUNTER — OFFICE VISIT (OUTPATIENT)
Dept: INTERNAL MEDICINE CLINIC | Age: 43
End: 2018-09-11

## 2018-09-11 VITALS
DIASTOLIC BLOOD PRESSURE: 81 MMHG | WEIGHT: 177 LBS | SYSTOLIC BLOOD PRESSURE: 131 MMHG | HEIGHT: 69 IN | BODY MASS INDEX: 26.22 KG/M2 | RESPIRATION RATE: 18 BRPM | HEART RATE: 104 BPM | TEMPERATURE: 102.6 F | OXYGEN SATURATION: 99 %

## 2018-09-11 DIAGNOSIS — J02.9 ACUTE PHARYNGITIS, UNSPECIFIED ETIOLOGY: Primary | ICD-10-CM

## 2018-09-11 DIAGNOSIS — E11.9 TYPE 2 DIABETES MELLITUS WITHOUT COMPLICATION, WITHOUT LONG-TERM CURRENT USE OF INSULIN (HCC): ICD-10-CM

## 2018-09-11 LAB
GLUCOSE POC: 119 MG/DL
HBA1C MFR BLD HPLC: 7.3 %

## 2018-09-11 RX ORDER — AZITHROMYCIN 250 MG/1
TABLET, FILM COATED ORAL
Qty: 6 TAB | Refills: 0 | Status: SHIPPED | OUTPATIENT
Start: 2018-09-11 | End: 2019-04-15 | Stop reason: ALTCHOICE

## 2018-09-11 NOTE — PROGRESS NOTES
Damari Gallo is a 40 y.o. male and presents with Sore Throat Lila Calixto Subjective: Pt w c/o few days tactile fever, sore throat, arthralgias, malaise and tender ln neck area. No known ill contact. No neck stiffness or photophobia No n/v/abd pain/rashes/recent insect bites. Lila Calixto Pt is tolerating PO fluids and food. Rapid strep neg Review of Systems Review of systems (12) negative, except noted above. Past Medical History:  
Diagnosis Date  Asthma  Diabetes ([facility]  Kidney stones  Seizures ([facility] Past Surgical History:  
Procedure Laterality Date  HX OTHER SURGICAL    
 adenoidectomy Social History Social History  Marital status: SINGLE Spouse name: N/A  
 Number of children: N/A  
 Years of education: N/A Social History Main Topics  Smoking status: Current Every Day Smoker Packs/day: 0.50 Types: Cigarettes  Smokeless tobacco: Current User  Alcohol use Yes Comment: seldomly  Drug use: No  
 Sexual activity: Yes  
  Partners: Female Other Topics Concern  None Social History Narrative Family History Problem Relation Age of Onset  Stroke Mother  Heart Disease Mother  Diabetes Mother  High Cholesterol Mother  Hypertension Mother  Thyroid Disease Mother  Hypertension Father  High Cholesterol Father  Diabetes Maternal Grandmother  Hypertension Maternal Grandmother  Arthritis-osteo Maternal Grandmother Current Outpatient Prescriptions Medication Sig Dispense Refill  azithromycin (ZITHROMAX) 250 mg tablet Take 2 tablets today, then take 1 tablet daily 6 Tab 0  
 aspirin 81 mg chewable tablet TAKE 1 TABLET DAILY 90 Tab 1  Blood-Glucose Meter (ONETOUCH ULTRAMINI) monitoring kit Dx E11.9 1 Kit 0  
 glucose blood VI test strips (BLOOD GLUCOSE TEST) strip Use BID DxE11.9 200 Strip 5  Lancets misc Use as directed.  Dx: E11.9 200 Each 5  
  metFORMIN (GLUCOPHAGE) 1,000 mg tablet Take 1 Tab by mouth two (2) times daily (with meals). 180 Tab 3  
 omeprazole (PRILOSEC) 20 mg capsule Take 1 Cap by mouth daily. 30 Cap 11  
 tamsulosin (FLOMAX) 0.4 mg capsule Take 1 Cap by mouth daily. To improve urination  Indications: SYMPTOMATIC BENIGN PROSTATIC HYPERPLASIA 30 Cap 2  
 fish oil-omega-3 fatty acids 340-1,000 mg capsule Take 1 Cap by mouth daily. 30 Cap 11 Allergies Allergen Reactions  Dilantin Infatabs [Phenytoin] Unknown (comments) Objective: 
Visit Vitals  /81 (BP 1 Location: Right arm, BP Patient Position: Sitting)  Pulse (!) 104  Temp (!) 102.6 °F (39.2 °C) (Oral)  Resp 18  Ht 5' 9\" (1.753 m)  Wt 177 lb (80.3 kg)  SpO2 99%  BMI 26.14 kg/m2 Physical Exam:  
General appearance - alert, appears tired Mental status - alert, oriented to person, place, and time Mouth - mucous membranes moist, + anjel enlarged tonsils w whitish exudate Neck - supple, no significant adenopathy Chest - clear to auscultation, no wheezes, rales or rhonchi, symmetric air entry Heart - tachycardicnormal S1, S2 Abdomen - soft, nontender, nondistended, no masses or organomegaly Ext-peripheral pulses normal, no pedal edema, no clubbing or cyanosis Skin-Warm and dry. no hyperpigmentation, vitiligo, or suspicious lesions Neuro -alert, oriented, normal speech, no focal findings or movement disorder noted Results for orders placed or performed in visit on 09/11/18 AMB POC HEMOGLOBIN A1C Result Value Ref Range Hemoglobin A1c (POC) 7.3 % AMB POC GLUCOSE BLOOD, BY GLUCOSE MONITORING DEVICE Result Value Ref Range Glucose  mg/dL Assessment/Plan: ICD-10-CM ICD-9-CM 1. Acute pharyngitis, unspecified etiology J02.9 462 azithromycin (ZITHROMAX) 250 mg tablet 2.  Type 2 diabetes mellitus without complication, without long-term current use of insulin (HCC) E11.9 250.00 AMB POC HEMOGLOBIN A1C  
 AMB POC GLUCOSE BLOOD, BY GLUCOSE MONITORING DEVICE Orders Placed This Encounter  AMB POC HEMOGLOBIN A1C  
 AMB POC GLUCOSE BLOOD, BY GLUCOSE MONITORING DEVICE  
 azithromycin (ZITHROMAX) 250 mg tablet Sig: Take 2 tablets today, then take 1 tablet daily Dispense:  6 Tab Refill:  0  
 
1. Acute pharyngitis, unspecified etiology Will tx empirically w abx Recommend fluid hydration/otc NSAIDS and rest 
Go to ED if acutely worsen 
- azithromycin (ZITHROMAX) 250 mg tablet; Take 2 tablets today, then take 1 tablet daily  Dispense: 6 Tab; Refill: 0 
 
2. Type 2 diabetes mellitus without complication, without long-term current use of insulin ([facility] Improved! Encourage pt to f/u REGULARLY 
- AMB POC HEMOGLOBIN A1C 
- AMB POC GLUCOSE BLOOD, BY GLUCOSE MONITORING DEVICE Patient Instructions Strep Throat: Care Instructions Your Care Instructions Strep throat is a bacterial infection that causes sudden, severe sore throat and fever. Strep throat, which is caused by bacteria called streptococcus, is treated with antibiotics. Sometimes a strep test is necessary to tell if the sore throat is caused by strep bacteria. Treatment can help ease symptoms and may prevent future problems. Follow-up care is a key part of your treatment and safety. Be sure to make and go to all appointments, and call your doctor if you are having problems. It's also a good idea to know your test results and keep a list of the medicines you take. How can you care for yourself at home? · Take your antibiotics as directed. Do not stop taking them just because you feel better. You need to take the full course of antibiotics. · Strep throat can spread to others until 24 hours after you begin taking antibiotics. During this time, you should avoid contact with other people at work or home, especially infants and children. Do not sneeze or cough on others, and wash your hands often.  Keep your drinking glass and eating utensils separate from those of others, and wash these items well in hot, soapy water. · Gargle with warm salt water at least once each hour to help reduce swelling and make your throat feel better. Use 1 teaspoon of salt mixed in 8 fluid ounces of warm water. · Take an over-the-counter pain medication, such as acetaminophen (Tylenol), ibuprofen (Advil, Motrin), or naproxen (Aleve). Read and follow all instructions on the label. · Try an over-the-counter anesthetic throat spray or throat lozenges, which may help relieve throat pain. · Drink plenty of fluids. Fluids may help soothe an irritated throat. Hot fluids, such as tea or soup, may help your throat feel better. · Eat soft solids and drink plenty of clear liquids. Flavored ice pops, ice cream, scrambled eggs, sherbet, and gelatin dessert (such as Jell-O) may also soothe the throat. · Get lots of rest. 
· Do not smoke, and avoid secondhand smoke. If you need help quitting, talk to your doctor about stop-smoking programs and medicines. These can increase your chances of quitting for good. · Use a vaporizer or humidifier to add moisture to the air in your bedroom. Follow the directions for cleaning the machine. When should you call for help? Call your doctor now or seek immediate medical care if: 
  · You have a new or higher fever.  
  · You have a fever with a stiff neck or severe headache.  
  · You have new or worse trouble swallowing.  
  · Your sore throat gets much worse on one side.  
  · Your pain becomes much worse on one side of your throat.  
 Watch closely for changes in your health, and be sure to contact your doctor if: 
  · You are not getting better after 2 days (48 hours).  
  · You do not get better as expected. Where can you learn more? Go to http://brian-jaya.info/. Enter K625 in the search box to learn more about \"Strep Throat: Care Instructions. \" Current as of: May 12, 2017 Content Version: 11.7 © 5909-3841 Healthwise, Incorporated. Care instructions adapted under license by [employer] (which disclaims liability or warranty for this information). If you have questions about a medical condition or this instruction, always ask your healthcare professional. [address] any warranty or liability for your use of this information. Follow-up Disposition: 
Return for for DM @ pts convenience. I have reviewed with the patient details of the assessment and plan and all questions were answered. Relevent patient education was performed. The most recent lab findings were reviewed with the patient. An After Visit Summary was printed and given to the patient.

## 2018-09-11 NOTE — PROGRESS NOTES
Chief Complaint Patient presents with  Sore Throat 1. Have you been to the ER, urgent care clinic since your last visit? Hospitalized since your last visit? No 
 
2. Have you seen or consulted any other health care providers outside of the [address] [place] [address] since your last visit? Include any pap smears or colon screening. No 
 
 
Pt been having diarrhea for 3 days now. Sore throat, swollen glands

## 2018-09-11 NOTE — PATIENT INSTRUCTIONS

## 2018-09-11 NOTE — MR AVS SNAPSHOT
[address] [facility] [address] [address] 
[phone] Patient: Army Jung MRN: [id number] :[id number] Visit Information Date & Time Provider Department Dept. Phone Encounter #  
 2018  1:30 PM Buddy Pascal[address]  [address] [id number] Follow-up Instructions Return for for DM @ pts convenience. Upcoming Health Maintenance Date Due DTaP/Tdap/Td series (1 - Tdap) 1997 EYE EXAM RETINAL OR DILATED Q1 10/13/2015 FOOT EXAM Q1 2016 HEMOGLOBIN A1C Q6M 7/10/2018 Influenza Age 5 to Adult 2018 MICROALBUMIN Q1 1/10/2019 LIPID PANEL Q1 1/10/2019 Allergies as of 2018  Review Complete On: 2018 By: Manuel Cuenca LPN Severity Noted Reaction Type Reactions Dilantin Infatabs [Phenytoin]  2013    Unknown (comments) Current Immunizations  Reviewed on 1/10/2018 Name Date Influenza Vaccine (Quad) PF 1/10/2018 Influenza Vaccine Intradermal PF 10/13/2014 Pneumococcal Polysaccharide (PPSV-23) 1/10/2018 Not reviewed this visit You Were Diagnosed With   
  
 Codes Comments Acute pharyngitis, unspecified etiology    -  Primary ICD-10-CM: J02.9 ICD-9-CM: 896 Type 2 diabetes mellitus without complication, without long-term current use of insulin (HCC)     ICD-10-CM: E11.9 ICD-9-CM: 250.00 Vitals BP Pulse Temp Resp Height(growth percentile) Weight(growth percentile) 131/81 (BP 1 Location: Right arm, BP Patient Position: Sitting) (!) 104 (!) 102.6 °F (39.2 °C) (Oral) 18 5' 9\" (1.753 m) 177 lb (80.3 kg) SpO2 BMI Smoking Status 99% 26.14 kg/m2 Current Every Day Smoker Vitals History BMI and BSA Data Body Mass Index Body Surface Area  
 26.14 kg/m 2 1.98 m 2 Preferred Pharmacy Pharmacy Name Phone [id number] [place] [phone] Your Updated Medication List  
  
   
This list is accurate as of 9/11/18  1:55 PM.  Always use your most recent med list.  
  
  
  
  
 aspirin 81 mg chewable tablet TAKE 1 TABLET DAILY  
  
 azithromycin 250 mg tablet Commonly known as:  Denys Willingham Take 2 tablets today, then take 1 tablet daily Blood-Glucose Meter monitoring kit Commonly known as:  Massiel Button Dx E11.9 fish oil-omega-3 fatty acids 340-1,000 mg capsule Take 1 Cap by mouth daily. glucose blood VI test strips strip Commonly known as:  blood glucose test  
Use BID DxE11.9 Lancets Misc Use as directed. Dx: E11.9  
  
 metFORMIN 1,000 mg tablet Commonly known as:  GLUCOPHAGE Take 1 Tab by mouth two (2) times daily (with meals). omeprazole 20 mg capsule Commonly known as:  PRILOSEC Take 1 Cap by mouth daily. tamsulosin 0.4 mg capsule Commonly known as:  FLOMAX Take 1 Cap by mouth daily. To improve urination  Indications: SYMPTOMATIC BENIGN PROSTATIC HYPERPLASIA Prescriptions Sent to Pharmacy Refills  
 azithromycin (ZITHROMAX) 250 mg tablet 0 Sig: Take 2 tablets today, then take 1 tablet daily Class: Normal  
 Pharmacy: [address] [place] [address] Ph #: [phone] We Performed the Following AMB POC GLUCOSE BLOOD, BY GLUCOSE MONITORING DEVICE [08958 CPT(R)] AMB POC HEMOGLOBIN A1C [64393 CPT(R)] Follow-up Instructions Return for for DM @ pts convenience. Patient Instructions Strep Throat: Care Instructions Your Care Instructions Strep throat is a bacterial infection that causes sudden, severe sore throat and fever. Strep throat, which is caused by bacteria called streptococcus, is treated with antibiotics. Sometimes a strep test is necessary to tell if the sore throat is caused by strep bacteria. Treatment can help ease symptoms and may prevent future problems. Follow-up care is a key part of your treatment and safety. Be sure to make and go to all appointments, and call your doctor if you are having problems. It's also a good idea to know your test results and keep a list of the medicines you take. How can you care for yourself at home? · Take your antibiotics as directed. Do not stop taking them just because you feel better. You need to take the full course of antibiotics. · Strep throat can spread to others until 24 hours after you begin taking antibiotics. During this time, you should avoid contact with other people at work or home, especially infants and children. Do not sneeze or cough on others, and wash your hands often. Keep your drinking glass and eating utensils separate from those of others, and wash these items well in hot, soapy water. · Gargle with warm salt water at least once each hour to help reduce swelling and make your throat feel better. Use 1 teaspoon of salt mixed in 8 fluid ounces of warm water. · Take an over-the-counter pain medication, such as acetaminophen (Tylenol), ibuprofen (Advil, Motrin), or naproxen (Aleve). Read and follow all instructions on the label. · Try an over-the-counter anesthetic throat spray or throat lozenges, which may help relieve throat pain. · Drink plenty of fluids. Fluids may help soothe an irritated throat. Hot fluids, such as tea or soup, may help your throat feel better. · Eat soft solids and drink plenty of clear liquids. Flavored ice pops, ice cream, scrambled eggs, sherbet, and gelatin dessert (such as Jell-O) may also soothe the throat. · Get lots of rest. 
· Do not smoke, and avoid secondhand smoke. If you need help quitting, talk to your doctor about stop-smoking programs and medicines. These can increase your chances of quitting for good.  
· Use a vaporizer or humidifier to add moisture to the air in your bedroom. Follow the directions for cleaning the machine. When should you call for help? Call your doctor now or seek immediate medical care if: 
  · You have a new or higher fever.  
  · You have a fever with a stiff neck or severe headache.  
  · You have new or worse trouble swallowing.  
  · Your sore throat gets much worse on one side.  
  · Your pain becomes much worse on one side of your throat.  
 Watch closely for changes in your health, and be sure to contact your doctor if: 
  · You are not getting better after 2 days (48 hours).  
  · You do not get better as expected. Where can you learn more? Go to http://brian-jaya.info/. Enter K625 in the search box to learn more about \"Strep Throat: Care Instructions. \" Current as of: May 12, 2017 Content Version: 11.7 © 4284-9612 [employer]. Care instructions adapted under license by [facility] (which disclaims liability or warranty for this information). If you have questions about a medical condition or this instruction, always ask your healthcare professional. [address] any warranty or liability for your use of this information. Introducing [facility]! [employer] introduces [employer] patient portal. Now you can access parts of your medical record, email your doctor's office, and request medication refills online. 1. In your internet browser, go to [email or web address] 2. Click on the First Time User? Click Here link in the Sign In box. You will see the New Member Sign Up page. 3. Enter your [employer] Access Code exactly as it appears below. You will not need to use this code after youve completed the sign-up process. If you do not sign up before the expiration date, you must request a new code. · [employer] Access Code: [facility] [employer] Expires: 12/10/2018  1:55 PM 
 
4.  Enter the last four digits of your Social Security Number (xxxx) and Date of Birth (mm/dd/yyyy) as indicated and click Submit. You will be taken to the next sign-up page. 5. Create a Pathfire ID. This will be your Path[employer] login ID and cannot be changed, so think of one that is secure and easy to remember. 6. Create a Pathfire password. You can change your password at any time. 7. Enter your Password Reset Question and Answer. This can be used at a later time if you forget your password. 8. Enter your e-mail address. You will receive e-mail notification when new information is available in [address] 9. Click Sign Up. You can now view and download portions of your medical record. 10. Click the Download Summary menu link to download a portable copy of your medical information. If you have questions, please visit the Frequently Asked Questions section of the Path[employer] website. Remember, [employer] is NOT to be used for urgent needs. For medical emergencies, dial 911. Now available from your iPhone and Android! Please provide this summary of care documentation to your next provider. Your primary care clinician is listed as Radha Tai. If you have any questions after today's visit, please call [phone].

## 2018-09-11 NOTE — LETTER
NOTIFICATION RETURN TO WORK / SCHOOL 
 
9/11/2018 1:50 PM 
 
Mr. Janusz Gongora [address] [id number] To Whom It May Concern: 
 
Janusz Gongora is currently under the care of [facility]. He will return to work/school on: 9/14/18. If there are questions or concerns please have the patient contact our office. Sincerely, Chanell Acosta MD

## 2018-09-13 ENCOUNTER — TELEPHONE (OUTPATIENT)
Dept: INTERNAL MEDICINE CLINIC | Age: 43
End: 2018-09-13

## 2018-09-13 NOTE — TELEPHONE ENCOUNTER
Pt called stating he is stiol having the same symptoms he had on Tuesday. He is supposed to return to work tomorrow but he does ot feel any better. He has a fever of 100-101, his throat is still sore and his condition is the same. He states he has almost completed the azithromycin.   Pt # [id number]

## 2018-09-26 RX ORDER — GUAIFENESIN 100 MG/5ML
LIQUID (ML) ORAL
Qty: 90 TAB | Refills: 1 | Status: SHIPPED | OUTPATIENT
Start: 2018-09-26 | End: 2019-02-07 | Stop reason: SDUPTHER

## 2019-01-28 DIAGNOSIS — E11.9 TYPE 2 DIABETES MELLITUS WITHOUT COMPLICATION, WITHOUT LONG-TERM CURRENT USE OF INSULIN (HCC): ICD-10-CM

## 2019-01-28 RX ORDER — METFORMIN HYDROCHLORIDE 1000 MG/1
TABLET ORAL
Qty: 180 TAB | Refills: 1 | Status: SHIPPED | OUTPATIENT
Start: 2019-01-28 | End: 2019-04-15 | Stop reason: SDUPTHER

## 2019-02-07 RX ORDER — GUAIFENESIN 100 MG/5ML
LIQUID (ML) ORAL
Qty: 90 TAB | Refills: 1 | Status: SHIPPED | OUTPATIENT
Start: 2019-02-07 | End: 2019-08-06 | Stop reason: SDUPTHER

## 2019-04-15 ENCOUNTER — OFFICE VISIT (OUTPATIENT)
Dept: INTERNAL MEDICINE CLINIC | Age: 44
End: 2019-04-15

## 2019-04-15 VITALS
SYSTOLIC BLOOD PRESSURE: 127 MMHG | DIASTOLIC BLOOD PRESSURE: 93 MMHG | RESPIRATION RATE: 19 BRPM | TEMPERATURE: 98.3 F | OXYGEN SATURATION: 97 % | WEIGHT: 179 LBS | HEIGHT: 69 IN | HEART RATE: 97 BPM | BODY MASS INDEX: 26.51 KG/M2

## 2019-04-15 DIAGNOSIS — Z11.3 SCREEN FOR STD (SEXUALLY TRANSMITTED DISEASE): ICD-10-CM

## 2019-04-15 DIAGNOSIS — Z12.5 PROSTATE CANCER SCREENING: ICD-10-CM

## 2019-04-15 DIAGNOSIS — F17.200 SMOKER: ICD-10-CM

## 2019-04-15 DIAGNOSIS — E11.8 TYPE 2 DIABETES MELLITUS WITH COMPLICATION, WITHOUT LONG-TERM CURRENT USE OF INSULIN (HCC): Primary | ICD-10-CM

## 2019-04-15 DIAGNOSIS — Z71.6 TOBACCO ABUSE COUNSELING: ICD-10-CM

## 2019-04-15 LAB — GLUCOSE POC: 189 MG/DL

## 2019-04-15 RX ORDER — METFORMIN HYDROCHLORIDE 1000 MG/1
TABLET ORAL
Qty: 180 TAB | Refills: 1 | Status: SHIPPED | OUTPATIENT
Start: 2019-04-15 | End: 2020-04-22

## 2019-04-15 NOTE — PROGRESS NOTES
Chief Complaint Patient presents with  Diabetes 1. Have you been to the ER, urgent care clinic since your last visit? Hospitalized since your last visit? No 
 
2. Have you seen or consulted any other health care providers outside of the [address] [place] [address] since your last visit? Include any pap smears or colon screening.  No 
 
 
[facility] amb Glucose Dr. Ritika Dorantes, SOFIYAN

## 2019-04-15 NOTE — PATIENT INSTRUCTIONS
Low Sodium Diet (2,000 Milligram): Care Instructions Your Care Instructions Too much sodium causes your body to hold on to extra water. This can raise your blood pressure and force your heart and kidneys to work harder. In very serious cases, this could cause you to be put in the hospital. It might even be life-threatening. By limiting sodium, you will feel better and lower your risk of serious problems. The most common source of sodium is salt. People get most of the salt in their diet from canned, prepared, and packaged foods. Fast food and restaurant meals also are very high in sodium. Your doctor will probably limit your sodium to less than 2,000 milligrams (mg) a day. This limit counts all the sodium in prepared and packaged foods and any salt you add to your food. Follow-up care is a key part of your treatment and safety. Be sure to make and go to all appointments, and call your doctor if you are having problems. It's also a good idea to know your test results and keep a list of the medicines you take. How can you care for yourself at home? Read food labels · Read labels on cans and food packages. The labels tell you how much sodium is in each serving. Make sure that you look at the serving size. If you eat more than the serving size, you have eaten more sodium. · Food labels also tell you the Percent Daily Value for sodium. Choose products with low Percent Daily Values for sodium. · Be aware that sodium can come in forms other than salt, including monosodium glutamate (MSG), sodium citrate, and sodium bicarbonate (baking soda). MSG is often added to Asian food. When you eat out, you can sometimes ask for food without MSG or added salt. Buy low-sodium foods · Buy foods that are labeled \"unsalted\" (no salt added), \"sodium-free\" (less than 5 mg of sodium per serving), or \"low-sodium\" (less than 140 mg of sodium per serving).  Foods labeled \"reduced-sodium\" and \"light sodium\" may still have too much sodium. Be sure to read the label to see how much sodium you are getting. · Buy fresh vegetables, or frozen vegetables without added sauces. Buy low-sodium versions of canned vegetables, soups, and other canned goods. Prepare low-sodium meals · Cut back on the amount of salt you use in cooking. This will help you adjust to the taste. Do not add salt after cooking. One teaspoon of salt has about 2,300 mg of sodium. · Take the salt shaker off the table. · Flavor your food with garlic, lemon juice, onion, vinegar, herbs, and spices. Do not use soy sauce, lite soy sauce, steak sauce, onion salt, garlic salt, celery salt, mustard, or ketchup on your food. · Use low-sodium salad dressings, sauces, and ketchup. Or make your own salad dressings and sauces without adding salt. · Use less salt (or none) when recipes call for it. You can often use half the salt a recipe calls for without losing flavor. Other foods such as rice, pasta, and grains do not need added salt. · Rinse canned vegetables, and cook them in fresh water. This removes somebut not allof the salt. · Avoid water that is naturally high in sodium or that has been treated with water softeners, which add sodium. Call your local water company to find out the sodium content of your water supply. If you buy bottled water, read the label and choose a sodium-free brand. Avoid high-sodium foods · Avoid eating: 
? Smoked, cured, salted, and canned meat, fish, and poultry. ? Ham, bourne, hot dogs, and luncheon meats. ? Regular, hard, and processed cheese and regular peanut butter. ? Crackers with salted tops, and other salted snack foods such as pretzels, chips, and salted popcorn. ? Frozen prepared meals, unless labeled low-sodium. ? Canned and dried soups, broths, and bouillon, unless labeled sodium-free or low-sodium. ? Canned vegetables, unless labeled sodium-free or low-sodium. ? Western Kala fries, pizza, tacos, and other fast foods. ? Pickles, olives, ketchup, and other condiments, especially soy sauce, unless labeled sodium-free or low-sodium. Where can you learn more? Go to http://brian-jaya.info/. Enter N651 in the search box to learn more about \"Low Sodium Diet (2,000 Milligram): Care Instructions. \" Current as of: March 28, 2018 Content Version: 11.9 © 6605-1082 [employer]. Care instructions adapted under license by [employer] (which disclaims liability or warranty for this information). If you have questions about a medical condition or this instruction, always ask your healthcare professional. [address] any warranty or liability for your use of this information.

## 2019-04-15 NOTE — PROGRESS NOTES
Alvarez Corcoran is a 44 y.o. male and presents with Diabetes Sri Goodman Subjective: 
 
Cannot perform POC A1c due to insurance Diabetes Mellitus Review: He has diabetes mellitus. Diabetic ROS - medication compliance: pt has been out of his medication for some time. Pt has a glucometer and his fsg ~  Known diabetic complications: none Cardiovascular risk factors: family history, dyslipidemia, diabetes mellitus,  hypertension Current diabetic medications include oral agents Eye exam current (within one year): pt has an appointment 4/30/19. Weight trend: stable Prior visit with dietician: no 
Current diet: \"healthy\" diet  in general 
Current exercise: walking Current monitoring regimen: home blood tests - daily Home blood sugar records: trend: stable Any episodes of hypoglycemia? no 
Is He on ACE inhibitor or angiotensin II receptor blocker? Yes Lab Results Component Value Date/Time Hemoglobin A1c 7.7 (H) 03/20/2009 03:20 AM  
 Hemoglobin A1c (POC) 7.3 09/11/2018 01:33 PM  
 
Lab Results Component Value Date/Time Glucose 270 (H) 01/10/2018 12:13 PM  
 Glucose (POC) 232 (H) 03/19/2009 11:20 AM  
 Glucose  04/15/2019 08:21 AM  
 
 
 
Pt has a h/o seizure d/o was on keppra. Pt was followed by neurology @ [facility]. Pt has been seizure-free OFF MEDS x ~ 4 yrs. Pt smokes ~ 1/2 ppd. Pt smokes only at work. Pt works as a dispatcher for [employer]. Pt relays he walks regularly. Review of Systems Constitutional: negative for fevers, chills, anorexia and weight loss Respiratory:  negative for cough, hemoptysis, dyspnea,wheezing CV:   negative for chest pain, palpitations, lower extremity edema GI:   negative for nausea, vomiting, diarrhea, abdominal pain,melena Musculoskel: negative for myalgias, arthralgias, back pain, muscle weakness, joint pain Neurological:  negative for headaches, dizziness, vertigo, memory problems and gait Behavl/Psych: negative for feelings of anxiety Past Medical History:  
Diagnosis Date  Asthma  Diabetes ([facility] [address]  Kidney stones  Seizures ([facility] [address] Past Surgical History:  
Procedure Laterality Date  HX OTHER SURGICAL    
 adenoidectomy Social History Socioeconomic History  Marital status: SINGLE Spouse name: Not on file  Number of children: Not on file  Years of education: Not on file  Highest education level: Not on file Tobacco Use  Smoking status: Current Every Day Smoker Packs/day: 0.50 Types: Cigarettes  Smokeless tobacco: Current User Substance and Sexual Activity  Alcohol use: Yes Comment: seldomly  Drug use: No  
  Types: Marijuana  Sexual activity: Yes  
  Partners: Female Family History Problem Relation Age of Onset  Stroke Mother  Heart Disease Mother  Diabetes Mother  High Cholesterol Mother  Hypertension Mother  Thyroid Disease Mother  Hypertension Father  High Cholesterol Father  Diabetes Maternal Grandmother  Hypertension Maternal Grandmother  Arthritis-osteo Maternal Grandmother Current Outpatient Medications Medication Sig Dispense Refill  metFORMIN (GLUCOPHAGE) 1,000 mg tablet TAKE 1 TABLET TWICE A DAY WITH MEALS 180 Tab 1  
 aspirin 81 mg chewable tablet TAKE 1 TABLET DAILY 90 Tab 1  
 glucose blood VI test strips (BLOOD GLUCOSE TEST) strip Use BID DxE11.9 200 Strip 5  Lancets misc Use as directed. Dx: E11.9 200 Each 5  
 fish oil-omega-3 fatty acids 340-1,000 mg capsule Take 1 Cap by mouth daily. 30 Cap 11 Allergies Allergen Reactions  Dilantin Infatabs [Phenytoin] Unknown (comments) Objective: 
Visit Vitals BP (!) 127/93 (BP 1 Location: Left arm, BP Patient Position: Sitting) Pulse 97 Temp 98.3 °F (36.8 °C) (Oral) Resp 19 Ht 5' 9\" (1.753 m) Wt 179 lb (81.2 kg) SpO2 97% BMI 26.43 kg/m² Physical Exam: General appearance - alert, well appearing, and in no distress Mental status - alert, oriented to person, place, and time EYE-EOMI 
ENT-ENT exam normal, no neck nodes or sinus tenderness Nose - normal and patent, no erythema, discharge or polyps Mouth - mucous membranes moist, pharynx normal without lesions Neck - supple, no significant adenopathy Chest - clear to auscultation, no wheezes, rales or rhonchi, symmetric air entry Heart - normal rate, regular rhythm, normal S1, S2 Abdomen - soft, nontender, nondistended, no masses or organomegaly Ext-peripheral pulses normal, no pedal edema, no clubbing or cyanosis Skin-Warm and dry. no hyperpigmentation, vitiligo, or suspicious lesions Neuro -alert, oriented, normal speech, no focal findings or movement disorder noted Results for orders placed or performed in visit on 04/15/19 AMB POC GLUCOSE BLOOD, BY GLUCOSE MONITORING DEVICE Result Value Ref Range Glucose  mg/dL Assessment/Plan: ICD-10-CM ICD-9-CM 1. Type 2 diabetes mellitus with complication, without long-term current use of insulin (HCC) E11.8 250.90 AMB POC GLUCOSE BLOOD, BY GLUCOSE MONITORING DEVICE  
   LIPID PANEL  
   METABOLIC PANEL, COMPREHENSIVE  
   CBC W/O DIFF  
   HEMOGLOBIN A1C WITH EAG  
   metFORMIN (GLUCOPHAGE) 1,000 mg tablet CANCELED: AMB POC HEMOGLOBIN A1C  
   CANCELED: AMB POC LIPID PROFILE 2. Screen for STD (sexually transmitted disease) Z11.3 V74.5 CHLAMYDIA/GC PCR T PALLIDUM SCREEN W/REFLEX T VAGINALIS AMPLIFICATION  
   HIV 1/2 AG/AB, 4TH GENERATION,W RFLX CONFIRM 3. Prostate cancer screening Z12.5 V76.44 PSA, DIAGNOSTIC (PROSTATE SPECIFIC AG) 4. Smoker F17.200 305.1 5. Tobacco abuse counseling Z71.6 V65.42   
  305.1 Orders Placed This Encounter  CHLAMYDIA/GC PCR Order Specific Question:   Sample source Answer:   Urine [258] Order Specific Question:   Specimen source Answer:   Urine [258]  LIPID PANEL  
 METABOLIC PANEL, COMPREHENSIVE  
 CBC W/O DIFF  
 T PALLIDUM SCREEN W/REFLEX [address] Order Specific Question:   Specimen type Answer:   Urine [258]  HIV 1/2 AG/AB, 4TH GENERATION,W RFLX CONFIRM  
 HEMOGLOBIN A1C WITH EAG  
 PROSTATE SPECIFIC AG  
 AMB POC GLUCOSE BLOOD, BY GLUCOSE MONITORING DEVICE  metFORMIN (GLUCOPHAGE) 1,000 mg tablet Sig: TAKE 1 TABLET TWICE A DAY WITH MEALS Dispense:  180 Tab Refill:  1 1. Type 2 diabetes mellitus with complication, without long-term current use of insulin ([facility] [address] - AMB POC GLUCOSE BLOOD, BY GLUCOSE MONITORING DEVICE 
- LIPID PANEL 
- METABOLIC PANEL, COMPREHENSIVE 
- CBC W/O DIFF 
- HEMOGLOBIN A1C WITH EAG 
- metFORMIN (GLUCOPHAGE) 1,000 mg tablet; TAKE 1 TABLET TWICE A DAY WITH MEALS  Dispense: 180 Tab; Refill: 1 2. Screen for STD (sexually transmitted disease) - CHLAMYDIA/GC PCR 
- T PALLIDUM SCREEN W/REFLEX 
- T VAGINALIS AMPLIFICATION 
- HIV 1/2 AG/AB, 4TH GENERATION,W RFLX CONFIRM 3. Prostate cancer screening - PSA, DIAGNOSTIC (PROSTATE SPECIFIC AG) 4. Smoker The patient was counseled on the dangers of tobacco use, and was advised to quit. Reviewed strategies to maximize success, including removing cigarettes and smoking materials from environment, stress management, substitution of other forms of reinforcement and written materials. Patient Instructions Low Sodium Diet (2,000 Milligram): Care Instructions Your Care Instructions Too much sodium causes your body to hold on to extra water. This can raise your blood pressure and force your heart and kidneys to work harder. In very serious cases, this could cause you to be put in the hospital. It might even be life-threatening. By limiting sodium, you will feel better and lower your risk of serious problems. The most common source of sodium is salt.  People get most of the salt in their diet from canned, prepared, and packaged foods. Fast food and restaurant meals also are very high in sodium. Your doctor will probably limit your sodium to less than 2,000 milligrams (mg) a day. This limit counts all the sodium in prepared and packaged foods and any salt you add to your food. Follow-up care is a key part of your treatment and safety. Be sure to make and go to all appointments, and call your doctor if you are having problems. It's also a good idea to know your test results and keep a list of the medicines you take. How can you care for yourself at home? Read food labels · Read labels on cans and food packages. The labels tell you how much sodium is in each serving. Make sure that you look at the serving size. If you eat more than the serving size, you have eaten more sodium. · Food labels also tell you the Percent Daily Value for sodium. Choose products with low Percent Daily Values for sodium. · Be aware that sodium can come in forms other than salt, including monosodium glutamate (MSG), sodium citrate, and sodium bicarbonate (baking soda). MSG is often added to Asian food. When you eat out, you can sometimes ask for food without MSG or added salt. Buy low-sodium foods · Buy foods that are labeled \"unsalted\" (no salt added), \"sodium-free\" (less than 5 mg of sodium per serving), or \"low-sodium\" (less than 140 mg of sodium per serving). Foods labeled \"reduced-sodium\" and \"light sodium\" may still have too much sodium. Be sure to read the label to see how much sodium you are getting. · Buy fresh vegetables, or frozen vegetables without added sauces. Buy low-sodium versions of canned vegetables, soups, and other canned goods. Prepare low-sodium meals · Cut back on the amount of salt you use in cooking. This will help you adjust to the taste. Do not add salt after cooking. One teaspoon of salt has about 2,300 mg of sodium. · Take the salt shaker off the table. · Flavor your food with garlic, lemon juice, onion, vinegar, herbs, and spices. Do not use soy sauce, lite soy sauce, steak sauce, onion salt, garlic salt, celery salt, mustard, or ketchup on your food. · Use low-sodium salad dressings, sauces, and ketchup. Or make your own salad dressings and sauces without adding salt. · Use less salt (or none) when recipes call for it. You can often use half the salt a recipe calls for without losing flavor. Other foods such as rice, pasta, and grains do not need added salt. · Rinse canned vegetables, and cook them in fresh water. This removes somebut not allof the salt. · Avoid water that is naturally high in sodium or that has been treated with water softeners, which add sodium. Call your local water company to find out the sodium content of your water supply. If you buy bottled water, read the label and choose a sodium-free brand. Avoid high-sodium foods · Avoid eating: 
? Smoked, cured, salted, and canned meat, fish, and poultry. ? Ham, bourne, hot dogs, and luncheon meats. ? Regular, hard, and processed cheese and regular peanut butter. ? Crackers with salted tops, and other salted snack foods such as pretzels, chips, and salted popcorn. ? Frozen prepared meals, unless labeled low-sodium. ? Canned and dried soups, broths, and bouillon, unless labeled sodium-free or low-sodium. ? Canned vegetables, unless labeled sodium-free or low-sodium. ? Western Kala fries, pizza, tacos, and other fast foods. ? Pickles, olives, ketchup, and other condiments, especially soy sauce, unless labeled sodium-free or low-sodium. Where can you learn more? Go to http://brian-jaya.info/. Enter B788 in the search box to learn more about \"Low Sodium Diet (2,000 Milligram): Care Instructions. \" Current as of: March 28, 2018 Content Version: 11.9 © 0516-1470 [employer].  Care instructions adapted under license by [address] (which disclaims liability or warranty for this information). If you have questions about a medical condition or this instruction, always ask your healthcare professional. [address] any warranty or liability for your use of this information. Follow-up and Dispositions · Return in about 6 weeks (around 5/27/2019) for bp check. I have reviewed with the patient details of the assessment and plan and all questions were answered. Relevent patient education was performed. The most recent lab findings were reviewed with the patient. An After Visit Summary was printed and given to the patient.

## 2019-06-05 ENCOUNTER — OFFICE VISIT (OUTPATIENT)
Dept: INTERNAL MEDICINE CLINIC | Age: 44
End: 2019-06-05

## 2019-06-05 VITALS
WEIGHT: 179 LBS | DIASTOLIC BLOOD PRESSURE: 89 MMHG | TEMPERATURE: 98.3 F | HEART RATE: 84 BPM | OXYGEN SATURATION: 95 % | RESPIRATION RATE: 19 BRPM | HEIGHT: 69 IN | SYSTOLIC BLOOD PRESSURE: 128 MMHG | BODY MASS INDEX: 26.51 KG/M2

## 2019-06-05 DIAGNOSIS — Z71.6 ENCOUNTER FOR TOBACCO USE CESSATION COUNSELING: ICD-10-CM

## 2019-06-05 DIAGNOSIS — Z11.3 SCREEN FOR STD (SEXUALLY TRANSMITTED DISEASE): ICD-10-CM

## 2019-06-05 DIAGNOSIS — E11.9 TYPE 2 DIABETES MELLITUS WITHOUT COMPLICATION, WITHOUT LONG-TERM CURRENT USE OF INSULIN (HCC): Primary | ICD-10-CM

## 2019-06-05 DIAGNOSIS — M79.602 PAIN IN INFERIOR LEFT UPPER EXTREMITY: ICD-10-CM

## 2019-06-05 RX ORDER — IBUPROFEN 800 MG/1
800 TABLET ORAL
Qty: 30 TAB | Refills: 1 | Status: SHIPPED | OUTPATIENT
Start: 2019-06-05 | End: 2020-09-10 | Stop reason: SDUPTHER

## 2019-06-05 NOTE — PATIENT INSTRUCTIONS

## 2019-06-05 NOTE — PROGRESS NOTES
Chief Complaint   Patient presents with    Hypertension     1. Have you been to the ER, urgent care clinic since your last visit? Hospitalized since your last visit? Yes When: [facility] for right side pain about 2 months ago    2. Have you seen or consulted any other health care providers outside of the [address] [place] [address] since your last visit? Include any pap smears or colon screening.  No

## 2019-06-05 NOTE — PROGRESS NOTES
Jhon Sanabria is a 44 y.o. male and presents with Hypertension  . Subjective:    Cannot perform POC A1c due to insurance    Pt w c/o left shoulder pain x4 weeks. Pain in the base of the neck w radiation to shoulder a/w upper arm pain and tingling. Matt Lee Pt visited [facility] and shoulder x-ray was neg. Pt is NOT taking anything for the pain. No h/o same. Diabetes Mellitus Review:  He has diabetes mellitus. Diabetic ROS - medication compliance: pt has been out of his medication for some time. Pt has a glucometer and his fsg ~   Known diabetic complications: none  Cardiovascular risk factors: family history, dyslipidemia, diabetes mellitus,  hypertension  Current diabetic medications include oral agents  Eye exam current (within one year):UTD  Weight trend: stable  Prior visit with dietician: no  Current diet: \"healthy\" diet  in general  Current exercise: walking  Current monitoring regimen: home blood tests - daily  Home blood sugar records: trend: stable  Any episodes of hypoglycemia? no  Is He on ACE inhibitor or angiotensin II receptor blocker? Yes   Lab Results   Component Value Date/Time    Hemoglobin A1c 7.7 (H) 03/20/2009 03:20 AM    Hemoglobin A1c (POC) 7.3 09/11/2018 01:33 PM     Lab Results   Component Value Date/Time    Glucose 270 (H) 01/10/2018 12:13 PM    Glucose (POC) 232 (H) 03/19/2009 11:20 AM    Glucose  04/15/2019 08:21 AM         Pt has a h/o seizure d/o was on keppra. Pt was followed by neurology @ [facility] [place]. Pt has been seizure-free OFF MEDS x ~ 4 yrs.     Tobacco-pt relays he has cut down to 1 pack/3 days    Pt relays he walks regularly now    Review of Systems  Constitutional: negative for fevers, chills, anorexia and weight loss  Respiratory:  negative for cough, hemoptysis, dyspnea,wheezing  CV:   negative for chest pain, palpitations, lower extremity edema  GI:   negative for nausea, vomiting, diarrhea, abdominal pain,melena  Musculoskel: negative for myalgias, arthralgias, back pain, muscle weakness, joint pain  Neurological:  negative for headaches, dizziness, vertigo, memory problems and gait   Behavl/Psych: negative for feelings of anxiety    Past Medical History:   Diagnosis Date    Asthma     Diabetes ([address]     Kidney stones     Seizures (HCC)      Past Surgical History:   Procedure Laterality Date    HX OTHER SURGICAL      adenoidectomy     Social History     Socioeconomic History    Marital status: SINGLE     Spouse name: Not on file    Number of children: Not on file    Years of education: Not on file    Highest education level: Not on file   Tobacco Use    Smoking status: Current Every Day Smoker     Packs/day: 0.50     Types: Cigarettes    Smokeless tobacco: Current User   Substance and Sexual Activity    Alcohol use: Yes     Comment: seldomly    Drug use: No     Types: Marijuana    Sexual activity: Yes     Partners: Female     Family History   Problem Relation Age of Onset    Stroke Mother     Heart Disease Mother     Diabetes Mother     High Cholesterol Mother     Hypertension Mother     Thyroid Disease Mother     Hypertension Father     High Cholesterol Father     Diabetes Maternal Grandmother     Hypertension Maternal Grandmother     Arthritis-osteo Maternal Grandmother      Current Outpatient Medications   Medication Sig Dispense Refill    ibuprofen (MOTRIN) 800 mg tablet Take 1 Tab by mouth every eight (8) hours as needed for Pain. Take w food 30 Tab 1    metFORMIN (GLUCOPHAGE) 1,000 mg tablet TAKE 1 TABLET TWICE A DAY WITH MEALS 180 Tab 1    aspirin 81 mg chewable tablet TAKE 1 TABLET DAILY 90 Tab 1    glucose blood VI test strips (BLOOD GLUCOSE TEST) strip Use BID DxE11.9 200 Strip 5    Lancets misc Use as directed. Dx: E11.9 200 Each 5    fish oil-omega-3 fatty acids 340-1,000 mg capsule Take 1 Cap by mouth daily.  30 Cap 11     Allergies   Allergen Reactions    Dilantin Infatabs [Phenytoin] Unknown (comments) Objective:  Visit Vitals  /90 (BP 1 Location: Left arm, BP Patient Position: Sitting)   Pulse 84   Temp 98.3 °F (36.8 °C) (Oral)   Resp 19   Ht 5' 9\" (1.753 m)   Wt 179 lb (81.2 kg)   SpO2 95%   BMI 26.43 kg/m²     Physical Exam:   General appearance - alert, well appearing, and in no distress  Mental status - alert, oriented to person, place, and time  EYE-EOMI  Mouth - mucous membranes moist, pharynx normal without lesions  Neck - supple, no significant adenopathy   Chest - clear to auscultation, no wheezes, rales or rhonchi, symmetric air entry   Heart - normal rate, regular rhythm, normal S1, S2  Abdomen - soft, nontender, nondistended, no masses or organomegaly  Ext-peripheral pulses normal, no pedal edema, no clubbing or cyanosis  Skin-Warm and dry. no hyperpigmentation, vitiligo, or suspicious lesions  Neuro -alert, oriented, normal speech, no focal findings or movement disorder noted      Results for orders placed or performed in visit on 04/15/19   AMB POC GLUCOSE BLOOD, BY GLUCOSE MONITORING DEVICE   Result Value Ref Range    Glucose  mg/dL       Assessment/Plan:    ICD-10-CM ICD-9-CM    1. Type 2 diabetes mellitus without complication, without long-term current use of insulin ([facility]) E11.9 250.00 MICROALBUMIN, UR, RAND W/ MICROALB/CREAT RATIO   2. Screen for STD (sexually transmitted disease) Z11.3 V74.5 CHLAMYDIA/GC PCR   3.  Pain in inferior left upper extremity M79.602 729.5 XR SPINE CERV 4 OR 5 V      ibuprofen (MOTRIN) 800 mg tablet     Orders Placed This Encounter    CHLAMYDIA/GC PCR     Order Specific Question:   Sample source     Answer:   Urine [258]     Order Specific Question:   Specimen source     Answer:   Urine [258]    XR SPINE CERV 4 OR 5 V     Standing Status:   Future     Standing Expiration Date:   9/5/2019     Order Specific Question:   Reason for Exam     Answer:   left arm pain    MICROALBUMIN, UR, RAND W/ MICROALB/CREAT RATIO    ibuprofen (MOTRIN) 800 mg tablet Sig: Take 1 Tab by mouth every eight (8) hours as needed for Pain. Take w food     Dispense:  30 Tab     Refill:  1   1. Type 2 diabetes mellitus without complication, without long-term current use of insulin ([facility])  Controlled   Lab slip reprinted from April  - MICROALBUMIN, UR, RAND W/ MICROALB/CREAT RATIO    2. Screen for STD (sexually transmitted disease)  None  - CHLAMYDIA/GC PCR    3. Pain in inferior left upper extremity  Most likely radiculopathy  - XR SPINE CERV 4 OR 5 V; Future  - ibuprofen (MOTRIN) 800 mg tablet; Take 1 Tab by mouth every eight (8) hours as needed for Pain. Take w food  Dispense: 30 Tab; Refill: 1    4. Encounter for tobacco use cessation counseling  The patient was counseled on the dangers of tobacco use, and was advised to quit. Reviewed strategies to maximize success, including written materials. Patient Instructions        Learning About Diabetes Food Guidelines  Your Care Instructions    Meal planning is important to manage diabetes. It helps keep your blood sugar at a target level (which you set with your doctor). You don't have to eat special foods. You can eat what your family eats, including sweets once in a while. But you do have to pay attention to how often you eat and how much you eat of certain foods. You may want to work with a dietitian or a certified diabetes educator (CDE) to help you plan meals and snacks. A dietitian or CDE can also help you lose weight if that is one of your goals. What should you know about eating carbs? Managing the amount of carbohydrate (carbs) you eat is an important part of healthy meals when you have diabetes. Carbohydrate is found in many foods. · Learn which foods have carbs. And learn the amounts of carbs in different foods. ? Bread, cereal, pasta, and rice have about 15 grams of carbs in a serving. A serving is 1 slice of bread (1 ounce), ½ cup of cooked cereal, or 1/3 cup of cooked pasta or rice. ?  Fruits have 15 grams of carbs in a serving. A serving is 1 small fresh fruit, such as an apple or orange; ½ of a banana; ½ cup of cooked or canned fruit; ½ cup of fruit juice; 1 cup of melon or raspberries; or 2 tablespoons of dried fruit. ? Milk and no-sugar-added yogurt have 15 grams of carbs in a serving. A serving is 1 cup of milk or 2/3 cup of no-sugar-added yogurt. ? Starchy vegetables have 15 grams of carbs in a serving. A serving is ½ cup of mashed potatoes or sweet potato; 1 cup winter squash; ½ of a small baked potato; ½ cup of cooked beans; or ½ cup cooked corn or green peas. · Learn how much carbs to eat each day and at each meal. A dietitian or CDE can teach you how to keep track of the amount of carbs you eat. This is called carbohydrate counting. · If you are not sure how to count carbohydrate grams, use the Plate Method to plan meals. It is a good, quick way to make sure that you have a balanced meal. It also helps you spread carbs throughout the day. ? Divide your plate by types of foods. Put non-starchy vegetables on half the plate, meat or other protein food on one-quarter of the plate, and a grain or starchy vegetable in the final quarter of the plate. To this you can add a small piece of fruit and 1 cup of milk or yogurt, depending on how many carbs you are supposed to eat at a meal.  · Try to eat about the same amount of carbs at each meal. Do not \"save up\" your daily allowance of carbs to eat at one meal.  · Proteins have very little or no carbs per serving. Examples of proteins are beef, chicken, turkey, fish, eggs, tofu, cheese, cottage cheese, and peanut butter. A serving size of meat is 3 ounces, which is about the size of a deck of cards. Examples of meat substitute serving sizes (equal to 1 ounce of meat) are 1/4 cup of cottage cheese, 1 egg, 1 tablespoon of peanut butter, and ½ cup of tofu. How can you eat out and still eat healthy? · Learn to estimate the serving sizes of foods that have carbohydrate. If you measure food at home, it will be easier to estimate the amount in a serving of restaurant food. · If the meal you order has too much carbohydrate (such as potatoes, corn, or baked beans), ask to have a low-carbohydrate food instead. Ask for a salad or green vegetables. · If you use insulin, check your blood sugar before and after eating out to help you plan how much to eat in the future. · If you eat more carbohydrate at a meal than you had planned, take a walk or do other exercise. This will help lower your blood sugar. What else should you know? · Limit saturated fat, such as the fat from meat and dairy products. This is a healthy choice because people who have diabetes are at higher risk of heart disease. So choose lean cuts of meat and nonfat or low-fat dairy products. Use olive or canola oil instead of butter or shortening when cooking. · Don't skip meals. Your blood sugar may drop too low if you skip meals and take insulin or certain medicines for diabetes. · Check with your doctor before you drink alcohol. Alcohol can cause your blood sugar to drop too low. Alcohol can also cause a bad reaction if you take certain diabetes medicines. Follow-up care is a key part of your treatment and safety. Be sure to make and go to all appointments, and call your doctor if you are having problems. It's also a good idea to know your test results and keep a list of the medicines you take. Where can you learn more? Go to http://brian-jaya.info/. Enter G392 in the search box to learn more about \"Learning About Diabetes Food Guidelines. \"  Current as of: July 25, 2018  Content Version: 11.9  © 7552-6885 [employer]. Care instructions adapted under license by [employer] (which disclaims liability or warranty for this information).  If you have questions about a medical condition or this instruction, always ask your healthcare professional. Marleen Vance disclaims any warranty or liability for your use of this information. Follow-up and Dispositions    · Return in about 4 months (around 10/5/2019) for DM f/u. I have reviewed with the patient details of the assessment and plan and all questions were answered. Relevent patient education was performed. The most recent lab findings were reviewed with the patient. An After Visit Summary was printed and given to the patient.

## 2019-08-06 RX ORDER — GUAIFENESIN 100 MG/5ML
LIQUID (ML) ORAL
Qty: 90 TAB | Refills: 1 | Status: SHIPPED | OUTPATIENT
Start: 2019-08-06 | End: 2020-02-03

## 2020-02-19 ENCOUNTER — OFFICE VISIT (OUTPATIENT)
Dept: INTERNAL MEDICINE CLINIC | Age: 45
End: 2020-02-19

## 2020-02-19 VITALS
TEMPERATURE: 98.8 F | HEART RATE: 103 BPM | OXYGEN SATURATION: 94 % | BODY MASS INDEX: 26.36 KG/M2 | SYSTOLIC BLOOD PRESSURE: 132 MMHG | HEIGHT: 69 IN | RESPIRATION RATE: 19 BRPM | DIASTOLIC BLOOD PRESSURE: 84 MMHG | WEIGHT: 178 LBS

## 2020-02-19 DIAGNOSIS — Z11.3 SCREEN FOR STD (SEXUALLY TRANSMITTED DISEASE): ICD-10-CM

## 2020-02-19 DIAGNOSIS — M25.511 CHRONIC PAIN OF BOTH SHOULDERS: Primary | ICD-10-CM

## 2020-02-19 DIAGNOSIS — B35.3 TINEA PEDIS OF BOTH FEET: ICD-10-CM

## 2020-02-19 DIAGNOSIS — M25.512 CHRONIC PAIN OF BOTH SHOULDERS: Primary | ICD-10-CM

## 2020-02-19 DIAGNOSIS — R36.9 PENILE DISCHARGE: ICD-10-CM

## 2020-02-19 DIAGNOSIS — G89.29 CHRONIC PAIN OF BOTH SHOULDERS: Primary | ICD-10-CM

## 2020-02-19 DIAGNOSIS — E11.9 TYPE 2 DIABETES MELLITUS WITHOUT COMPLICATION, WITHOUT LONG-TERM CURRENT USE OF INSULIN (HCC): ICD-10-CM

## 2020-02-19 LAB
BILIRUB UR QL STRIP: NEGATIVE
GLUCOSE POC: 163 MG/DL
GLUCOSE UR-MCNC: NEGATIVE MG/DL
KETONES P FAST UR STRIP-MCNC: NORMAL MG/DL
PH UR STRIP: 6.5 [PH] (ref 4.6–8)
PROT UR QL STRIP: NORMAL
SP GR UR STRIP: 1.03 (ref 1–1.03)
UA UROBILINOGEN AMB POC: NORMAL (ref 0.2–1)
URINALYSIS CLARITY POC: CLEAR
URINALYSIS COLOR POC: YELLOW
URINE BLOOD POC: NORMAL
URINE LEUKOCYTES POC: NORMAL
URINE NITRITES POC: NEGATIVE

## 2020-02-19 RX ORDER — KETOCONAZOLE 20 MG/G
CREAM TOPICAL DAILY
Qty: 30 G | Refills: 0 | Status: SHIPPED | OUTPATIENT
Start: 2020-02-19 | End: 2020-07-02 | Stop reason: ALTCHOICE

## 2020-02-19 RX ORDER — AZITHROMYCIN 500 MG/1
1000 TABLET, FILM COATED ORAL ONCE
Qty: 2 TAB | Refills: 0 | Status: SHIPPED | OUTPATIENT
Start: 2020-02-19 | End: 2020-02-19

## 2020-02-19 NOTE — PROGRESS NOTES
Salomon Medrano is a 38 y.o. male and presents with Shoulder Pain (ongoing); Penile Discharge (2 weeks ); and Nail Problem  . Subjective:    Cannot perform POC A1c due to insurance    Pt has multiple concerns:  1)Pt w c/o right shoulder pain now (previously had left neck and shoulder pain). Pt did NOT do c-spine x-ray as ordered last visit. 2)Pt  w c/o penile d/c x 2 weeks. NO dysuria. No penile skin discomfort. Pt is sexually active , MOSTLY using condoms  3)Rash b/t toes x mths      Diabetes Mellitus Review:  He has diabetes mellitus. Diabetic ROS - medication compliance: pt relays compliance w his medicationhas been out of his medication for some time. Pt has a glucometer and his fsg ~   Known diabetic complications: none  Cardiovascular risk factors: family history, dyslipidemia, diabetes mellitus,  hypertension  Current diabetic medications include oral agents  Eye exam current (within one year):UTD  Weight trend: stable  Prior visit with dietician: no  Current diet: \"healthy\" diet  in general  Current exercise: walking  Current monitoring regimen: home blood tests - daily  Home blood sugar records: trend: stable  Any episodes of hypoglycemia? no  Is He on ACE inhibitor or angiotensin II receptor blocker? Yes   Lab Results   Component Value Date/Time    Hemoglobin A1c 7.7 (H) 03/20/2009 03:20 AM    Hemoglobin A1c (POC) 7.3 09/11/2018 01:33 PM     Lab Results   Component Value Date/Time    Glucose 270 (H) 01/10/2018 12:13 PM    Glucose (POC) 232 (H) 03/19/2009 11:20 AM    Glucose  02/19/2020 10:24 AM         Pt has a h/o seizure d/o was on keppra. Pt was followed by neurology @ [facility] [place]. Pt has been seizure-free OFF MEDS x ~ 4 yrs.     Tobacco-pt relays he has cut down to 1 pack/3 days      Review of Systems  Constitutional: negative for fevers, chills, anorexia and weight loss  Respiratory:  negative for cough, hemoptysis, dyspnea,wheezing  CV:   negative for chest pain, palpitations, lower extremity edema  GI:   negative for nausea, vomiting, diarrhea, abdominal pain,melena  Musculoskel: negative for myalgias, arthralgias, back pain, muscle weakness, joint pain  Neurological:  negative for headaches, dizziness, vertigo, memory problems and gait   Behavl/Psych: negative for feelings of anxiety    Past Medical History:   Diagnosis Date    Asthma     [facility]     Kidney stones     Seizures ([facility]      Past Surgical History:   Procedure Laterality Date    HX OTHER SURGICAL      adenoidectomy     Social History     Socioeconomic History    Marital status: SINGLE     Spouse name: Not on file    Number of children: Not on file    Years of education: Not on file    Highest education level: Not on file   Tobacco Use    Smoking status: Current Every Day Smoker     Packs/day: 0.50     Years: 15.00     Pack years: 7.50     Types: Cigarettes    Smokeless tobacco: Current User   Substance and Sexual Activity    Alcohol use: Yes     Comment: seldomly    Drug use: No     Types: Marijuana    Sexual activity: Yes     Partners: Female     Family History   Problem Relation Age of Onset    Stroke Mother     Heart Disease Mother     Diabetes Mother     High Cholesterol Mother     Hypertension Mother     Thyroid Disease Mother     Hypertension Father     High Cholesterol Father     Diabetes Maternal Grandmother     Hypertension Maternal Grandmother     Arthritis-osteo Maternal Grandmother      Current Outpatient Medications   Medication Sig Dispense Refill    azithromycin (ZITHROMAX) 500 mg tab Take 2 Tabs by mouth once for 1 dose. 2 Tab 0    ketoconazole (NIZORAL) 2 % topical cream Apply  to affected area daily. 30 g 0    aspirin 81 mg chewable tablet CHEW 1 TABLET DAILY 90 Tab 0    ibuprofen (MOTRIN) 800 mg tablet Take 1 Tab by mouth every eight (8) hours as needed for Pain.  Take w food 30 Tab 1    metFORMIN (GLUCOPHAGE) 1,000 mg tablet TAKE 1 TABLET TWICE A DAY WITH MEALS 180 Tab 1    glucose blood VI test strips (BLOOD GLUCOSE TEST) strip Use BID DxE11.9 200 Strip 5    Lancets misc Use as directed. Dx: E11.9 200 Each 5    fish oil-omega-3 fatty acids 340-1,000 mg capsule Take 1 Cap by mouth daily. 30 Cap 11     Allergies   Allergen Reactions    Dilantin Infatabs [Phenytoin] Unknown (comments)       Objective:  Visit Vitals  /84 (BP 1 Location: Left arm, BP Patient Position: Sitting)   Pulse (!) 103   Temp 98.8 °F (37.1 °C) (Oral)   Resp 19   Ht 5' 9\" (1.753 m)   Wt 178 lb (80.7 kg)   SpO2 94%   BMI 26.29 kg/m²     Physical Exam:   General appearance - alert, well appearing, and in no distress  Mental status - alert, oriented to person, place, and time  EYE-EOMI  Mouth - mucous membranes moist, pharynx normal without lesions  Neck - supple, no significant adenopathy   Chest - clear to auscultation, no wheezes, rales or rhonchi, symmetric air entry   Heart - normal rate, regular rhythm, normal S1, S2  Abdomen - soft, nontender, nondistended, no masses or organomegaly  Ext-peripheral pulses normal, no pedal edema, no clubbing or cyanosis  Skin-Warm and dry.  no hyperpigmentation, vitiligo, or suspicious lesions  Neuro -alert, oriented, normal speech, no focal findings or movement disorder noted      Results for orders placed or performed in visit on 02/19/20   AMB POC GLUCOSE BLOOD, BY GLUCOSE MONITORING DEVICE   Result Value Ref Range    Glucose  mg/dL   AMB POC URINALYSIS DIP STICK AUTO W/O MICRO   Result Value Ref Range    Color (UA POC) Yellow     Clarity (UA POC) Clear     Glucose (UA POC) Negative Negative    Bilirubin (UA POC) Negative Negative    Ketones (UA POC) Trace Negative    Specific gravity (UA POC) 1.030 1.001 - 1.035    Blood (UA POC) 2+ Negative    pH (UA POC) 6.5 4.6 - 8.0    Protein (UA POC) 2+ Negative    Urobilinogen (UA POC) 0.2 mg/dL 0.2 - 1    Nitrites (UA POC) Negative Negative    Leukocyte esterase (UA POC) Trace Negative       Assessment/Plan: ICD-10-CM ICD-9-CM    1. Chronic pain of both shoulders M25.511 719.41 REFERRAL TO ORTHOPEDIC SURGERY    G89.29 338.29     M25.512     2. Penile discharge R36.9 788.7 AMB POC GLUCOSE BLOOD, BY GLUCOSE MONITORING DEVICE      AMB POC URINALYSIS DIP STICK AUTO W/O MICRO      CULTURE, URINE      CHLAMYDIA/GC PCR      T VAGINALIS AMPLIFICATION      CEFTRIAXONE SODIUM INJECTION  MG      azithromycin (ZITHROMAX) 500 mg tab   3. Type 2 diabetes mellitus without complication, without long-term current use of insulin (HCC) E11.9 250.00 LIPID PANEL      METABOLIC PANEL, COMPREHENSIVE      HEMOGLOBIN A1C WITH EAG   4. Tinea pedis of both feet B35.3 110.4 ketoconazole (NIZORAL) 2 % topical cream   5.  Screen for STD (sexually transmitted disease) Z11.3 V74.5 HIV 1/2 AG/AB, 4TH GENERATION,W RFLX CONFIRM     Orders Placed This Encounter    CULTURE, URINE    CHLAMYDIA/GC PCR     Order Specific Question:   Sample source     Answer:   Urine [258]     Order Specific Question:   Specimen source     Answer:   Urine [258]    LIPID PANEL    METABOLIC PANEL, COMPREHENSIVE    HEMOGLOBIN A1C WITH EAG    HIV 1/2 AG/AB, 4TH GENERATION,W RFLX CONFIRM    T VAGINALIS AMPLIFICATION     Order Specific Question:   Specimen source     Answer:   Urine [258]    REFERRAL TO ORTHOPEDIC SURGERY     Referral Priority:   Routine     Referral Type:   Consultation     Referral Reason:   Specialty Services Required     Referred to Provider:   Cain Carter MD (Jody)     Requested Specialty:   Orthopedic Surgery     Number of Visits Requested:   1    AMB POC GLUCOSE BLOOD, BY GLUCOSE MONITORING DEVICE    AMB POC URINALYSIS DIP STICK AUTO W/O MICRO    CEFTRIAXONE SODIUM INJECTION  MG     Mix per protocol     Order Specific Question:   Dose     Answer:   250 mg     Order Specific Question:   Site     Answer:   LEFT DELTOID     Order Specific Question:   Expiration Date     Answer:   7/31/2021     Order Specific Question:   Lot# Answer:   [id number]     Order Specific Question:        Answer:   Saloni Ochoa     Order Specific Question:   Charge Quantity? Answer:   1     Order Specific Question:   Perfomed by/Witnessed by: Answer:   Arlene Marinelli LPN     Order Specific Question:   NDC#     Answer:   8883-1754-36 [[id number]]    azithromycin (ZITHROMAX) 500 mg tab     Sig: Take 2 Tabs by mouth once for 1 dose. Dispense:  2 Tab     Refill:  0    ketoconazole (NIZORAL) 2 % topical cream     Sig: Apply  to affected area daily. Dispense:  30 g     Refill:  0   1. Chronic pain of both shoulders  Most likely cervical radiculopathy  - REFERRAL TO ORTHOPEDIC SURGERY    2. Penile discharge  STD vs candidal infxn  - AMB POC GLUCOSE BLOOD, BY GLUCOSE MONITORING DEVICE  - AMB POC URINALYSIS DIP STICK AUTO W/O MICRO  - CULTURE, URINE  - CHLAMYDIA/GC PCR  - T VAGINALIS AMPLIFICATION  - CEFTRIAXONE SODIUM INJECTION  MG  - azithromycin (ZITHROMAX) 500 mg tab; Take 2 Tabs by mouth once for 1 dose. Dispense: 2 Tab; Refill: 0    3. Type 2 diabetes mellitus without complication, without long-term current use of insulin ([facility])    - LIPID PANEL  - METABOLIC PANEL, COMPREHENSIVE  - HEMOGLOBIN A1C WITH EAG    4. Tinea pedis of both feet    - ketoconazole (NIZORAL) 2 % topical cream; Apply  to affected area daily. Dispense: 30 g; Refill: 0    5. Screen for STD (sexually transmitted disease)    - HIV 1/2 AG/AB, 4TH GENERATION,W RFLX CONFIRM        There are no Patient Instructions on file for this visit. Follow-up and Dispositions    · Return in about 3 months (around 5/19/2020) for DM f/u. I have reviewed with the patient details of the assessment and plan and all questions were answered. Relevent patient education was performed. The most recent lab findings were reviewed with the patient. An After Visit Summary was printed and given to the patient.

## 2020-02-19 NOTE — PROGRESS NOTES
Chief Complaint   Patient presents with    Shoulder Pain     ongoing    Penile Discharge     2 weeks     Nail Problem     1. Have you been to the ER, urgent care clinic since your last visit? Hospitalized since your last visit? Yes When: 2 weeks ago to [facility] for URI    2. Have you seen or consulted any other health care providers outside of the [address] [place] [address] since your last visit? Include any pap smears or colon screening. No       After obtaining consent, and per orders of Dr. Ramez Mckenna, injection of Ceftriaxone 250mg given by Micheal Nathan LPN. Patient instructed to remain in clinic for 20 minutes afterwards, and to report any adverse reaction to me immediately.

## 2020-02-26 LAB
BACTERIA UR CULT: NO GROWTH
C TRACH RRNA SPEC QL NAA+PROBE: NEGATIVE
N GONORRHOEA RRNA SPEC QL NAA+PROBE: NEGATIVE
T VAGINALIS DNA SPEC QL NAA+PROBE: POSITIVE

## 2020-02-27 DIAGNOSIS — A59.9 TRICHOMONOSIS: Primary | ICD-10-CM

## 2020-02-27 RX ORDER — METRONIDAZOLE 500 MG/1
2000 TABLET ORAL ONCE
Qty: 4 TAB | Refills: 0 | Status: SHIPPED | OUTPATIENT
Start: 2020-02-27 | End: 2020-02-27

## 2020-02-27 NOTE — PROGRESS NOTES
He has Trich, which is a sexually transmitted disease. You get this from having sex with someone who is also infected. In women, symptoms can be a foul smelling vaginal discharge or no symptoms at all. Men do not have symptoms but can still spread the infection to their partners. I have called in a medicine that will treat the infection. You AND your partner need to be treated and need to not have sex for 7 days after taking the medicine. Your partner needs to contact his physician for treatment. Sent Rx for flagyl to treat this.

## 2020-04-02 ENCOUNTER — VIRTUAL VISIT (OUTPATIENT)
Dept: INTERNAL MEDICINE CLINIC | Age: 45
End: 2020-04-02

## 2020-04-02 DIAGNOSIS — J30.89 ENVIRONMENTAL AND SEASONAL ALLERGIES: Primary | ICD-10-CM

## 2020-04-02 RX ORDER — LORATADINE 10 MG/1
10 TABLET ORAL DAILY
Qty: 90 TAB | Refills: 0 | Status: SHIPPED | OUTPATIENT
Start: 2020-04-02

## 2020-04-02 RX ORDER — FLUTICASONE PROPIONATE 50 MCG
2 SPRAY, SUSPENSION (ML) NASAL DAILY
Qty: 1 BOTTLE | Refills: 1 | Status: SHIPPED | OUTPATIENT
Start: 2020-04-02 | End: 2020-04-16

## 2020-04-02 NOTE — PROGRESS NOTES
Consent: Reddy Encinas, who was seen by synchronous (real-time) audio-video technology, and/or his healthcare decision maker, is aware that this patient-initiated, Telehealth encounter on 4/2/2020 is a billable service, with coverage as determined by his insurance carrier. He is aware that he may receive a bill and has provided verbal consent to proceed: Yes. Allergy symptoms: x 3 days he has had worsening nasal congestion - some clear drainage. Hx of severe environment allergies since childhood - has even been on injections in the past. This is the worst it has been in a while. He has tried otc  zyrtec, allergra w/o relief. Afrin w/ minimal relief, sudafed w/ temp relief  claritin helped some in the past    Denies cough, fever, body aches. Assessment & Plan:   Diagnoses and all orders for this visit:    1. Environmental and seasonal allergies  -     loratadine (Claritin) 10 mg tablet; Take 1 Tab by mouth daily. For allergies  -     fluticasone propionate (FLONASE) 50 mcg/actuation nasal spray; 2 Sprays by Both Nostrils route daily for 14 days. Reviewed s/s of bacterial sinusitis to watch for. I spent at least 5 minutes with this established patient, and >50% of the time was spent counseling and/or coordinating care regarding s/s of bacterial sinusitis to call back for  712  Subjective:   Reddy Encinas is a 38 y.o. male who was seen for No chief complaint on file. Prior to Admission medications    Medication Sig Start Date End Date Taking? Authorizing Provider   loratadine (Claritin) 10 mg tablet Take 1 Tab by mouth daily. For allergies 4/2/20  Yes Laurita Rahman., IRV[place]   fluticasone propionate (FLONASE) 50 mcg/actuation nasal spray 2 Sprays by Both Nostrils route daily for 14 days. 4/2/20 4/16/20 Yes Laurita Rahman.IRVING   ketoconazole (NIZORAL) 2 % topical cream Apply  to affected area daily.  2/19/20   Angel Covington MD   aspirin 81 mg chewable tablet CHEW 1 TABLET DAILY 2/3/20   Prashant Morales MD   ibuprofen (MOTRIN) 800 mg tablet Take 1 Tab by mouth every eight (8) hours as needed for Pain. Take w food 6/5/19   Prashant Morales MD   metFORMIN (GLUCOPHAGE) 1,000 mg tablet TAKE 1 TABLET TWICE A DAY WITH MEALS 4/15/19   Prashant Morales MD   glucose blood VI test strips (BLOOD GLUCOSE TEST) strip Use BID DxE11.9 3/16/18   Prashant Morales MD   Lancets misc Use as directed. Dx: E11.9 3/16/18   Prashant Morales MD   fish oil-omega-3 fatty acids 340-1,000 mg capsule Take 1 Cap by mouth daily. 12/4/15   Tod Hillman MD     Allergies   Allergen Reactions    Dilantin Infatabs [Phenytoin] Unknown (comments)       Patient Active Problem List   Diagnosis Code    Diabetes ([facility] [address] E11.9    History of seizure disorder Z86.69    Smoker F17.200     Patient Active Problem List    Diagnosis Date Noted    Smoker 04/15/2019    History of seizure disorder 01/10/2018    Diabetes [facility])      Current Outpatient Medications   Medication Sig Dispense Refill    loratadine (Claritin) 10 mg tablet Take 1 Tab by mouth daily. For allergies 90 Tab 0    fluticasone propionate (FLONASE) 50 mcg/actuation nasal spray 2 Sprays by Both Nostrils route daily for 14 days. 1 Bottle 1    ketoconazole (NIZORAL) 2 % topical cream Apply  to affected area daily. 30 g 0    aspirin 81 mg chewable tablet CHEW 1 TABLET DAILY 90 Tab 0    ibuprofen (MOTRIN) 800 mg tablet Take 1 Tab by mouth every eight (8) hours as needed for Pain. Take w food 30 Tab 1    metFORMIN (GLUCOPHAGE) 1,000 mg tablet TAKE 1 TABLET TWICE A DAY WITH MEALS 180 Tab 1    glucose blood VI test strips (BLOOD GLUCOSE TEST) strip Use BID DxE11.9 200 Strip 5    Lancets misc Use as directed. Dx: E11.9 200 Each 5    fish oil-omega-3 fatty acids 340-1,000 mg capsule Take 1 Cap by mouth daily.  30 Cap 11     Allergies   Allergen Reactions    Dilantin Infatabs [Phenytoin] Unknown (comments) Past Medical History:   Diagnosis Date    Asthma     Diabetes ([facility] [address]     Kidney stones     Seizures (HCC)      Past Surgical History:   Procedure Laterality Date    HX OTHER SURGICAL      adenoidectomy     Family History   Problem Relation Age of Onset    Stroke Mother     Heart Disease Mother     Diabetes Mother     High Cholesterol Mother     Hypertension Mother     Thyroid Disease Mother     Hypertension Father     High Cholesterol Father     Diabetes Maternal Grandmother     Hypertension Maternal Grandmother     Arthritis-osteo Maternal Grandmother      Social History     Tobacco Use    Smoking status: Current Every Day Smoker     Packs/day: 0.50     Years: 15.00     Pack years: 7.50     Types: Cigarettes    Smokeless tobacco: Current User   Substance Use Topics    Alcohol use: Yes     Comment: seldomly       ROS        Objective:   Vital Signs: (As obtained by patient/caregiver at home)  There were no vitals taken for this visit.      [INSTRUCTIONS:  \"[x]\" Indicates a positive item  \"[]\" Indicates a negative item  -- DELETE ALL ITEMS NOT EXAMINED]    Constitutional: [x] Appears well-developed and well-nourished [x] No apparent distress      [x] Abnormal - voice is nasally    Mental status: [x] Alert and awake  [x] Oriented to person/place/time [x] Able to follow commands    [] Abnormal -     Eyes:   EOM    [x]  Normal    [] Abnormal -   Sclera  [x]  Normal    [] Abnormal -          Discharge [x]  None visible   [] Abnormal -     HENT: [x] Normocephalic, atraumatic  [] Abnormal -   [x] Mouth/Throat: Mucous membranes are moist    External Ears [x] Normal  [] Abnormal -    Neck: [x] No visualized mass [] Abnormal -     Pulmonary/Chest: [x] Respiratory effort normal   [x] No visualized signs of difficulty breathing or respiratory distress        [] Abnormal -      Musculoskeletal:   [x] Normal gait with no signs of ataxia         [x] Normal range of motion of neck        [] Abnormal - Neurological:        [x] No Facial Asymmetry (Cranial nerve 7 motor function) (limited exam due to video visit)          [x] No gaze palsy        [] Abnormal -          Skin:        [x] No significant exanthematous lesions or discoloration noted on facial skin         [] Abnormal -            Psychiatric:       [x] Normal Affect [] Abnormal -        [x] No Hallucinations    Other pertinent observable physical exam findings:-        We discussed the expected course, resolution and complications of the diagnosis(es) in detail. Medication risks, benefits, costs, interactions, and alternatives were discussed as indicated. I advised him to contact the office if his condition worsens, changes or fails to improve as anticipated. He expressed understanding with the diagnosis(es) and plan. Cely Mahmood is a 38 y.o. male being evaluated by a video visit encounter for concerns as above. A caregiver was present when appropriate. Due to this being a TeleHealth encounter (During VORYD-08 public health emergency), evaluation of the following organ systems was limited: Vitals/Constitutional/EENT/Resp/CV/GI//MS/Neuro/Skin/Heme-Lymph-Imm. Pursuant to the emergency declaration under the [facility], [facility]5 waiver authority and the [employer] Act, this Virtual  Visit was conducted, with patient's (and/or legal guardian's) consent, to reduce the patient's risk of exposure to COVID-19 and provide necessary medical care. Services were provided through a video synchronous discussion virtually to substitute for in-person clinic visit. Patient and provider were located at their individual homes. Lisa Evans NP

## 2020-04-22 DIAGNOSIS — E11.8 TYPE 2 DIABETES MELLITUS WITH COMPLICATION, WITHOUT LONG-TERM CURRENT USE OF INSULIN (HCC): ICD-10-CM

## 2020-04-22 RX ORDER — METFORMIN HYDROCHLORIDE 1000 MG/1
TABLET ORAL
Qty: 180 TAB | Refills: 3 | Status: SHIPPED | OUTPATIENT
Start: 2020-04-22 | End: 2021-04-19

## 2020-07-02 ENCOUNTER — OFFICE VISIT (OUTPATIENT)
Dept: URGENT CARE | Age: 45
End: 2020-07-02

## 2020-07-02 VITALS — HEART RATE: 90 BPM | RESPIRATION RATE: 17 BRPM | OXYGEN SATURATION: 96 % | TEMPERATURE: 98.7 F

## 2020-07-02 DIAGNOSIS — Z11.59 SCREENING FOR VIRAL DISEASE: Primary | ICD-10-CM

## 2020-07-02 NOTE — PROGRESS NOTES
The history is provided by the patient. Cold Symptoms   The history is provided by the patient. This is a new problem. The current episode started 2 days ago. The problem occurs constantly. The problem has not changed since onset. The cough is non-productive. There has been no fever. Associated symptoms include sore throat. Pertinent negatives include no chills, no shortness of breath and no wheezing. Associated symptoms comments: Cough and nasal congestion. Patient states he does have a history of allergies.     Past Medical History:   Diagnosis Date    Asthma     Diabetes ([address]     Kidney stones     Seizures ([facility] [address]         Past Surgical History:   Procedure Laterality Date    HX OTHER SURGICAL      adenoidectomy         Family History   Problem Relation Age of Onset    Stroke Mother     Heart Disease Mother     Diabetes Mother     High Cholesterol Mother     Hypertension Mother     Thyroid Disease Mother     Hypertension Father     High Cholesterol Father     Diabetes Maternal Grandmother     Hypertension Maternal Grandmother     Arthritis-osteo Maternal Grandmother         Social History     Socioeconomic History    Marital status: SINGLE     Spouse name: Not on file    Number of children: Not on file    Years of education: Not on file    Highest education level: Not on file   Occupational History    Not on file   Social Needs    Financial resource strain: Not on file    Food insecurity     Worry: Not on file     Inability: Not on file    Transportation needs     Medical: Not on file     Non-medical: Not on file   Tobacco Use    Smoking status: Current Every Day Smoker     Packs/day: 0.50     Years: 15.00     Pack years: 7.50     Types: Cigarettes    Smokeless tobacco: Current User   Substance and Sexual Activity    Alcohol use: Yes     Comment: seldomly    Drug use: No     Types: Marijuana    Sexual activity: Yes     Partners: Female   Lifestyle    Physical activity     Days per week: Not on file     Minutes per session: Not on file    Stress: Not on file   Relationships    Social connections     Talks on phone: Not on file     Gets together: Not on file     Attends [employer] service: Not on file     Active member of club or organization: Not on file     Attends meetings of clubs or organizations: Not on file     Relationship status: Not on file    Intimate partner violence     Fear of current or ex partner: Not on file     Emotionally abused: Not on file     Physically abused: Not on file     Forced sexual activity: Not on file   Other Topics Concern    Not on file   Social History Narrative    Not on file                ALLERGIES: Dilantin infatabs [phenytoin]    Review of Systems   Constitutional: Negative for chills, fatigue and fever. HENT: Positive for congestion, postnasal drip and sore throat. Negative for trouble swallowing. Respiratory: Positive for cough. Negative for chest tightness, shortness of breath and wheezing. Cardiovascular: Negative. Gastrointestinal: Negative. Musculoskeletal: Negative. Neurological: Negative. Vitals:    07/02/20 1619   Pulse: 90   Resp: 17   Temp: 98.7 °F (37.1 °C)   SpO2: 94%       Physical Exam  Constitutional:       General: He is not in acute distress. Appearance: Normal appearance. He is not ill-appearing. HENT:      Nose: Congestion and rhinorrhea present. Mouth/Throat:      Pharynx: No oropharyngeal exudate or posterior oropharyngeal erythema. Comments: Post nasal drainage  Cardiovascular:      Rate and Rhythm: Normal rate. Heart sounds: Normal heart sounds. Pulmonary:      Effort: Pulmonary effort is normal.      Breath sounds: Normal breath sounds. No wheezing or rhonchi. Neurological:      Mental Status: He is alert and oriented to person, place, and time.    Psychiatric:         Mood and Affect: Mood normal.         MDM     Differential Diagnosis; Clinical Impression; Plan:     (Z11.59) Screening for viral disease  (primary encounter diagnosis)  No orders of the defined types were placed in this encounter. Tested patient for COVID-19. Patient given education material.  The condition was discussed with the patient and they understand. If symptoms worsen the pt is to go to the ER. Advised patient to take tylenol for discomfort. Advised to quarantine self. This patient was seen in [facility] Clinic at [address] [place] [address] Urgent Care while in their vehicle due to COVID-19 pandemic with PPE and focused examination in order to decrease community viral transmission. The patient/guardian gave verbal consent to treat.               Procedures

## 2020-07-10 LAB — SARS-COV-2, NAA: NOT DETECTED

## 2020-08-07 ENCOUNTER — VIRTUAL VISIT (OUTPATIENT)
Dept: INTERNAL MEDICINE CLINIC | Age: 45
End: 2020-08-07
Payer: COMMERCIAL

## 2020-08-07 DIAGNOSIS — Z20.2 STD EXPOSURE: Primary | ICD-10-CM

## 2020-08-07 PROCEDURE — 99213 OFFICE O/P EST LOW 20 MIN: CPT | Performed by: INTERNAL MEDICINE

## 2020-08-07 RX ORDER — METRONIDAZOLE 500 MG/1
2000 TABLET ORAL ONCE
Qty: 4 TAB | Refills: 0 | Status: SHIPPED | OUTPATIENT
Start: 2020-08-07 | End: 2020-08-07

## 2020-08-07 NOTE — PROGRESS NOTES
Chief Complaint   Patient presents with    Exposure to STD     1. Have you been to the ER, urgent care clinic since your last visit? Hospitalized since your last visit? No    2. Have you seen or consulted any other health care providers outside of the [address] [place] [address] since your last visit? Include any pap smears or colon screening.  No

## 2020-08-07 NOTE — PROGRESS NOTES
Sal Gabriel is a 38 y.o. male who was seen by synchronous (real-time) audio-video technology on 8/7/2020 for Exposure to STD        Assessment & Plan:   1. STD exposure  D/w pt safe sex practices  - metroNIDAZOLE (FLAGYL) 500 mg tablet; Take 4 Tabs by mouth once for 1 dose. Indications: an infection due to Trichomonas vaginalis  Dispense: 4 Tab; Refill: 0    712  Subjective:       Pt was dx'ed w trichomonas ~ 6 mths ago. He has been having unprotected sex with the same partner ike tx. He was recently informed by said partner that she has trichomonas again. PMH:  Diabetes Mellitus Review:  He has diabetes mellitus. Diabetic ROS - medication compliance: pt relays compliance w his medicationhas been out of his medication for some time. Pt has a glucometer and his fsg ~   Known diabetic complications: none  Cardiovascular risk factors: family history, dyslipidemia, diabetes mellitus,  hypertension  Current diabetic medications include oral agents  Eye exam current (within one year):UTD  Weight trend: stable  Prior visit with dietician: no  Current diet: \"healthy\" diet  in general  Current exercise: walking  Current monitoring regimen: home blood tests - daily  Home blood sugar records: trend: stable  Any episodes of hypoglycemia? no  Is He on ACE inhibitor or angiotensin II receptor blocker?    Yes   Lab Results   Component Value Date/Time    Hemoglobin A1c 7.7 (H) 03/20/2009 03:20 AM    Hemoglobin A1c (POC) 7.3 09/11/2018 01:33 PM     Lab Results   Component Value Date/Time    Glucose 270 (H) 01/10/2018 12:13 PM    Glucose (POC) 232 (H) 03/19/2009 11:20 AM    Glucose  02/19/2020 10:24 AM     Lab Results   Component Value Date/Time    Cholesterol, total 208 (H) 01/10/2018 12:13 PM    Cholesterol (POC) 165 01/30/2015 09:32 AM    HDL Cholesterol 43 01/10/2018 12:13 PM    HDL Cholesterol (POC) 37 01/30/2015 09:32 AM    LDL Cholesterol (POC) 117 01/30/2015 09:32 AM    LDL, calculated 151 (H) 01/10/2018 12:13 PM    VLDL, calculated 14 01/10/2018 12:13 PM    Triglyceride 68 01/10/2018 12:13 PM    Triglycerides (POC) 51 01/30/2015 09:32 AM    CHOL/HDL Ratio 5.0 03/20/2009 03:20 AM     BP Readings from Last 3 Encounters:   02/19/20 132/84   06/05/19 128/89   04/15/19 (!) 127/93       Pt has a h/o seizure d/o was on keppra. Pt was followed by neurology @ [facility]. Pt has been seizure-free OFF MEDS x ~ 4 yrs. Tobacco-pt relays he has cut down to 1 pack/3 days      Prior to Admission medications    Medication Sig Start Date End Date Taking? Authorizing Provider   aspirin 81 mg chewable tablet CHEW 1 TABLET DAILY 5/4/20  Yes Murtaza Dhillon MD   metFORMIN (GLUCOPHAGE) 1,000 mg tablet TAKE 1 TABLET TWICE A DAY WITH MEALS 4/22/20  Yes Murtaza Dhillon MD   ibuprofen (MOTRIN) 800 mg tablet Take 1 Tab by mouth every eight (8) hours as needed for Pain. Take w food 6/5/19  Yes Murtaza Dhillon MD   glucose blood VI test strips (BLOOD GLUCOSE TEST) strip Use BID DxE11.9 3/16/18  Yes Murtaza Dhillon MD   Lancets misc Use as directed. Dx: E11.9 3/16/18  Yes Murtaza Dhillon MD   fish oil-omega-3 fatty acids 340-1,000 mg capsule Take 1 Cap by mouth daily. 12/4/15  Yes Ghulam Welsh MD   loratadine (Claritin) 10 mg tablet Take 1 Tab by mouth daily. For allergies 4/2/20   José Miguel Whaley, IRVING         Objective:   No flowsheet data found. General: alert, cooperative, no distress   Mental  status: normal mood, behavior, speech, dress, motor activity, and thought processes, able to follow commands   HENT: NCAT   Neck: no visualized mass   Resp: no respiratory distress   Neuro: no gross deficits   Skin: no discoloration or lesions of concern on visible areas   Psychiatric: normal affect, consistent with stated mood, no evidence of hallucinations     Additional exam findings: We discussed the expected course, resolution and complications of the diagnosis(es) in detail.   Medication risks, benefits, costs, interactions, and alternatives were discussed as indicated. I advised him to contact the office if his condition worsens, changes or fails to improve as anticipated. He expressed understanding with the diagnosis(es) and plan. Farhad Rangel, who was evaluated through a patient-initiated, synchronous (real-time) audio-video encounter, and/or his healthcare decision maker, is aware that it is a billable service, with coverage as determined by his insurance carrier. He provided verbal consent to proceed: Yes, and patient identification was verified. It was conducted pursuant to the emergency declaration under the [address] [place] [address], [address] [place] [address] authority and the [employer] Act. A caregiver was present when appropriate. Ability to conduct physical exam was limited. I was at home. The patient was walking down the street.       Aisha Jesus MD

## 2020-09-10 ENCOUNTER — OFFICE VISIT (OUTPATIENT)
Dept: INTERNAL MEDICINE CLINIC | Age: 45
End: 2020-09-10
Payer: COMMERCIAL

## 2020-09-10 VITALS
OXYGEN SATURATION: 99 % | HEIGHT: 69 IN | RESPIRATION RATE: 17 BRPM | HEART RATE: 90 BPM | BODY MASS INDEX: 25.62 KG/M2 | DIASTOLIC BLOOD PRESSURE: 98 MMHG | WEIGHT: 173 LBS | SYSTOLIC BLOOD PRESSURE: 142 MMHG | TEMPERATURE: 98.1 F

## 2020-09-10 DIAGNOSIS — M79.602 PAIN IN INFERIOR LEFT UPPER EXTREMITY: ICD-10-CM
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
## 2020-09-10 PROCEDURE — 99213 OFFICE O/P EST LOW 20 MIN: CPT | Performed by: NURSE PRACTITIONER

## 2020-09-10 RX ORDER — CYCLOBENZAPRINE HCL 10 MG
10 TABLET ORAL
Qty: 30 TAB | Refills: 0 | Status: SHIPPED | OUTPATIENT
Start: 2020-09-10 | End: 2020-11-05 | Stop reason: SDUPTHER

## 2020-09-10 RX ORDER — IBUPROFEN 800 MG/1
800 TABLET ORAL
Qty: 30 TAB | Refills: 1 | Status: SHIPPED | OUTPATIENT
Start: 2020-09-10 | End: 2020-11-05 | Stop reason: SDUPTHER
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
## 2020-09-10 NOTE — PATIENT INSTRUCTIONS
Neck: Exercises Introduction Here are some examples of exercises for you to try. The exercises may be suggested for a condition or for rehabilitation. Start each exercise slowly. Ease off the exercises if you start to have pain. You will be told when to start these exercises and which ones will work best for you. How to do the exercises Neck stretch 1. This stretch works best if you keep your shoulder down as you lean away from it. To help you remember to do this, start by relaxing your shoulders and lightly holding on to your thighs or your chair. 2. Tilt your head toward your shoulder and hold for 15 to 30 seconds. Let the weight of your head stretch your muscles. 3. If you would like a little added stretch, use your hand to gently and steadily pull your head toward your shoulder. For example, keeping your right shoulder down, lean your head to the left. 4. Repeat 2 to 4 times toward each shoulder. Diagonal neck stretch 1. Turn your head slightly toward the direction you will be stretching, and tilt your head diagonally toward your chest and hold for 15 to 30 seconds. 2. If you would like a little added stretch, use your hand to gently and steadily pull your head forward on the diagonal. 
3. Repeat 2 to 4 times toward each side. Dorsal glide stretch The dorsal glide stretches the back of the neck. If you feel pain, do not glide so far back. Some people find this exercise easier to do while lying on their backs with an ice pack on the neck. 1. Sit or stand tall and look straight ahead. 2. Slowly tuck your chin as you glide your head backward over your body 3. Hold for a count of 6, and then relax for up to 10 seconds. 4. Repeat 8 to 12 times. Chest and shoulder stretch 1. Sit or stand tall and glide your head backward as in the dorsal glide stretch. 2. Raise both arms so that your hands are next to your ears. 3. Take a deep breath, and as you breathe out, lower your elbows down and behind your back. You will feel your shoulder blades slide down and together, and at the same time you will feel a stretch across your chest and the front of your shoulders. 4. Hold for about 6 seconds, and then relax for up to 10 seconds. 5. Repeat 8 to 12 times. Strengthening: Hands on head 1. Move your head backward, forward, and side to side against gentle pressure from your hands, holding each position for about 6 seconds. 2. Repeat 8 to 12 times. Follow-up care is a key part of your treatment and safety. Be sure to make and go to all appointments, and call your doctor if you are having problems. It's also a good idea to know your test results and keep a list of the medicines you take. Where can you learn more? Go to http://www.dhillon.com/ Enter P975 in the search box to learn more about \"Neck: Exercises. \" Current as of: March 2, 2020               Content Version: 12.6 © 2964-8181 [employer]. Care instructions adapted under license by [employer] (which disclaims liability or warranty for this information). If you have questions about a medical condition or this instruction, always ask your healthcare professional. [address] any warranty or liability for your use of this information. Shoulder Stretches: Exercises Introduction Here are some examples of exercises for you to try. The exercises may be suggested for a condition or for rehabilitation. Start each exercise slowly. Ease off the exercises if you start to have pain. You will be told when to start these exercises and which ones will work best for you. How to do the exercises Shoulder stretch 1.  a doorway and place one arm against the door frame. Your elbow should be a little higher than your shoulder.  
2. Relax your shoulders as you lean forward, allowing your chest and shoulder muscles to stretch. You can also turn your body slightly away from your arm to stretch the muscles even more. 3. Hold for 15 to 30 seconds. 4. Repeat 2 to 4 times with each arm. Shoulder and chest stretch 1. Shoulder and chest stretch 2. While sitting, relax your upper body so you slump slightly in your chair. 3. As you breathe in, straighten your back and open your arms out to the sides. 4. Gently pull your shoulder blades back and downward. 5. Hold for 15 to 30 seconds as your breathe normally. 6. Repeat 2 to 4 times. Overhead stretch 1. Reach up over your head with both arms. 2. Hold for 15 to 30 seconds. 3. Repeat 2 to 4 times. Follow-up care is a key part of your treatment and safety. Be sure to make and go to all appointments, and call your doctor if you are having problems. It's also a good idea to know your test results and keep a list of the medicines you take. Where can you learn more? Go to http://brian-jaya.info/ Enter S254 in the search box to learn more about \"Shoulder Stretches: Exercises. \" Current as of: March 2, 2020               Content Version: 12.6 © 4634-1616 [employer]. Care instructions adapted under license by [employer] (which disclaims liability or warranty for this information). If you have questions about a medical condition or this instruction, always ask your healthcare professional. [place] [address] any warranty or liability for your use of this information.
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
## 2020-09-16 ENCOUNTER — OFFICE VISIT (OUTPATIENT)
Dept: INTERNAL MEDICINE CLINIC | Age: 45
End: 2020-09-16
Payer: COMMERCIAL

## 2020-09-16 VITALS
SYSTOLIC BLOOD PRESSURE: 133 MMHG | HEIGHT: 69 IN | OXYGEN SATURATION: 96 % | BODY MASS INDEX: 25.62 KG/M2 | WEIGHT: 173 LBS | HEART RATE: 103 BPM | RESPIRATION RATE: 19 BRPM | DIASTOLIC BLOOD PRESSURE: 95 MMHG | TEMPERATURE: 97.4 F

## 2020-09-16 DIAGNOSIS — M54.2 NECK PAIN: Primary | ICD-10-CM

## 2020-09-16 DIAGNOSIS — M54.50 ACUTE LEFT-SIDED LOW BACK PAIN WITHOUT SCIATICA: ICD-10-CM

## 2020-09-16 DIAGNOSIS — J45.20 MILD INTERMITTENT ASTHMA WITHOUT COMPLICATION: ICD-10-CM

## 2020-09-16 DIAGNOSIS — M25.512 ACUTE PAIN OF LEFT SHOULDER: ICD-10-CM

## 2020-09-16 PROCEDURE — 99213 OFFICE O/P EST LOW 20 MIN: CPT | Performed by: INTERNAL MEDICINE

## 2020-09-16 RX ORDER — ALBUTEROL SULFATE 90 UG/1
1 AEROSOL, METERED RESPIRATORY (INHALATION)
Qty: 1 INHALER | Refills: 2 | Status: SHIPPED | OUTPATIENT
Start: 2020-09-16
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
## 2020-09-30 ENCOUNTER — PATIENT MESSAGE (OUTPATIENT)
Dept: INTERNAL MEDICINE CLINIC | Age: 45
End: 2020-09-30

## 2020-10-16 NOTE — LETTER
NOTIFICATION RETURN TO WORK / SCHOOL 
 
2/19/2020 11:10 AM 
 
Mr. Army Jung [address] [place] [id number] To Whom It May Concern: 
 
Army Jung is currently under the care of Han Solomon. He will return to work/school on: 02/21/2020 If there are questions or concerns please have the patient contact our office. Sincerely, Dinesh Galvan MD 
 
                                
 
 injections which help, trigger point injections, OXY-IR ) for the symptoms. The treatment provided significant relief. Mental Health Problem   The primary symptoms include negative symptoms. The primary symptoms do not include dysphoric mood, hallucinations, bizarre behavior, disorganized speech or somatic symptoms. The current episode started more than 1 month ago. This is a chronic problem. The onset of the illness is precipitated by a stressful event (chronic pain). The degree of incapacity that he is experiencing as a consequence of his illness is severe. Sequelae of the illness include an inability to work. Additional symptoms of the illness include anhedonia and fatigue. Additional symptoms of the illness do not include unexpected weight change, psychomotor retardation, feelings of worthlessness, attention impairment, euphoric mood, increased goal-directed activity, flight of ideas, inflated self-esteem, decreased need for sleep, distractible, poor judgment, visual change, headaches, abdominal pain or seizures. He does not admit to suicidal ideas. He does not have a plan to attempt suicide. He does not contemplate harming himself. He has not already injured self. He does not contemplate injuring another person. He has not already  injured another person. Risk factors that are present for mental illness include a history of mental illness. Hand Pain    The incident occurred more than 1 week ago. The incident occurred at home. There was no injury mechanism. The pain is present in the right wrist and right hand. The quality of the pain is described as cramping. The pain does not radiate. The pain is at a severity of 3/10. The pain is moderate. The pain has been intermittent since the incident. Associated symptoms include numbness. Pertinent negatives include no chest pain, muscle weakness or tingling. The symptoms are aggravated by movement. He has tried ice for the symptoms.  The treatment provided moderate relief. Neck Pain    This is a recurrent problem. The current episode started more than 1 month ago. The problem occurs constantly. The problem has been gradually worsening. The pain is present in the occipital region. The quality of the pain is described as aching. The pain is at a severity of 4/10. The pain is moderate. The symptoms are aggravated by twisting. Stiffness is present in the morning. Associated symptoms include leg pain, numbness and weakness. Pertinent negatives include no chest pain, fever, headaches, pain with swallowing, paresis, photophobia, syncope, tingling, trouble swallowing or visual change. He has tried heat, acetaminophen, bed rest, home exercises, muscle relaxants, neck support, ice, NSAIDs and oral narcotics for the symptoms. The treatment provided mild relief.        Past Medical History:   Diagnosis Date    Chronic back pain     Coronary artery disease 2002    Dr. Codey [facility] at [facility] Esophageal ulcer 3/10/2014    Dr. Ned Griffin    Gastric ulcer 3/10/2014    Dr. Ned Griffin    Gout     Hiatal hernia 3/10/2014    Dr. Jonna Lozano Hyperlipidemia     Hypertension     Impotence 10/16/2020    MI (myocardial infarction) [facility]) 2005    Dr. Carlos Cartagena Peripheral vascular disease ([facility] [address]     Prediabetes     Schizo-affective schizophrenia, in remission ([address] 2/1/2005    Overview:  followed at the [facility] Severe single current episode of major depressive disorder, without psychotic features ([address] 7/8/2016    Status post coronary artery stent placement 2002    Dr. Codey Moore     Past Surgical History:   Procedure Laterality Date    Johanna Marmolejo  01/03/2020    Dr. Moustapha Szymanski Right 6/4/2019    RIGHT WRIST CARPAL TUNNEL RELEASE, SUPINE, PAT AT PCP performed by Jessica Dahl MD at [address] [place] [address]  3/10/14    DR Ida Velázquez    CORONARY ANGIOPLASTY WITH [address] GRAFT  10/17/2017    KNEE ARTHROSCOPY Left 2002    Dr. Kenney Galdamez  12/19/13    CAUDAL BLOCKS DONE BY DR Celi Beavers OTHER SURGICAL HISTORY  04/2020    [facility]      SPINE SURGERY  9/2009    Dr. Marie Keyes  2008    Dr. Jose Landaverde  3/10/14    [facility], [place] Naomi Metz     Social History     Socioeconomic History    Marital status:      Spouse name: None    Number of children: None    Years of education: None    Highest education level: None   Occupational History    Occupation: disability    Social Needs    Financial resource strain: None    Food insecurity     Worry: None     Inability: None    Transportation needs     Medical: None     Non-medical: None   Tobacco Use    Smoking status: Never Smoker    Smokeless tobacco: Never Used   Substance and Sexual Activity    Alcohol use: No     Alcohol/week: 0.0 standard drinks     Comment: Stopped years ago.  Drug use: No     Comment: YEARS AGO    Sexual activity: Yes     Partners: Female     Comment: monogomous sexual partner, wife. Lifestyle    Physical activity     Days per week: None     Minutes per session: None    Stress: None   Relationships    Social connections     Talks on phone: None     Gets together: None     Attends [employer]ism service: None     Active member of club or organization: None     Attends meetings of clubs or organizations: None     Relationship status: None    Intimate partner violence     Fear of current or ex partner: None     Emotionally abused: None     Physically abused: None     Forced sexual activity: None   Other Topics Concern    None   Social History Narrative    Tobacco -- never smoked or chewed. Alcohol -- have not used for past 10 years, prior to had consumed 6 pack of beer daily.     Drugs -- tried marijuana and cocaine 14 years ago occasionally used for 3-4 years and then stopped completely 10 years ago. Education -- completed 11th grade in [place].    Occupation -- [employer] work,  at [employer].    Marital status --  44 years, 2 grown sons. Family History   Problem Relation Age of Onset    High Blood Pressure Mother     Arthritis Mother     Cancer Father         throat    Arthritis Father        No Known Allergies    Review of Systems   Constitutional: Positive for activity change and fatigue. Negative for chills, diaphoresis, fever and unexpected weight change. HENT: Negative for congestion, ear discharge, ear pain, hearing loss, nosebleeds, sore throat, tinnitus and trouble swallowing. Eyes: Negative for photophobia, pain and redness. Respiratory: Positive for shortness of breath. Negative for cough, wheezing and stridor. Shortness of breath on exertion   Cardiovascular: Negative for chest pain, palpitations, leg swelling and syncope. Gastrointestinal: Positive for constipation. Negative for abdominal pain, blood in stool, bowel incontinence, diarrhea, nausea and vomiting. Endocrine: Negative for polydipsia. Genitourinary: Negative for bladder incontinence, dysuria, flank pain, frequency, hematuria and urgency. Musculoskeletal: Positive for back pain, gait problem and myalgias. Negative for neck pain. Skin: Negative for rash. Allergic/Immunologic: Positive for immunocompromised state. Negative for environmental allergies. Neurological: Positive for weakness and numbness. Negative for dizziness, tingling, tremors, seizures and headaches. Hematological: Does not bruise/bleed easily. Psychiatric/Behavioral: Negative for dysphoric mood, hallucinations and suicidal ideas. The patient is not nervous/anxious.         Objective    Vitals:    10/16/20 1019   BP: (!) 140/78   Site: Left Upper Arm   Position: Sitting   Cuff Size: Small Adult   Weight: 156 lb (70.8 kg) Height: 5' 7\" (1.702 m)     Pain Score: EIGHT (With medication)       Physical Exam  Vitals signs reviewed. Constitutional:       General: He is not in acute distress. Appearance: He is well-developed. He is not ill-appearing, toxic-appearing or diaphoretic. Comments:     HENT:      Head: Normocephalic and atraumatic. Right Ear: Hearing normal.      Left Ear: Hearing normal.      Nose: Nose normal.      Mouth/Throat:      Mouth: No oral lesions. Dentition: Normal dentition. Pharynx: No oropharyngeal exudate. Eyes:      General: No scleral icterus. Right eye: No discharge. Left eye: No discharge. Conjunctiva/sclera: Conjunctivae normal.      Right eye: No chemosis or exudate. Left eye: No chemosis or exudate. Pupils: Pupils are equal, round, and reactive to light. Neck:      Musculoskeletal: Neck supple. Spinous process tenderness and muscular tenderness present. No edema or neck rigidity. Thyroid: No thyromegaly. Vascular: No JVD. Trachea: No tracheal tenderness or tracheal deviation. Cardiovascular:      Pulses: No decreased pulses. Heart sounds: No murmur. No friction rub. No gallop. Pulmonary:      Effort: Pulmonary effort is normal. No tachypnea, bradypnea, accessory muscle usage or respiratory distress. Breath sounds: Decreased breath sounds present. No wheezing or rales. Chest:      Chest wall: No tenderness. Abdominal:      General: There is no distension. Palpations: Abdomen is soft. There is no mass. Tenderness: There is no abdominal tenderness. There is no guarding or rebound. Musculoskeletal:         General: Tenderness present. Right shoulder: He exhibits decreased range of motion, tenderness and bony tenderness. Left shoulder: He exhibits decreased range of motion, tenderness and bony tenderness.       Right elbow: Normal.     Left elbow: Normal.      Right wrist: Normal.      Left wrist: Normal.      Right hip: Normal.      Left hip: Normal.      Right knee: Normal.      Left knee: Normal.      Right ankle: Normal. Achilles tendon normal.      Left ankle: Normal. Achilles tendon normal.      Cervical back: He exhibits decreased range of motion, tenderness, bony tenderness, laceration, pain and spasm. He exhibits no swelling, no edema and no deformity. Thoracic back: He exhibits decreased range of motion, tenderness, bony tenderness, deformity, pain and spasm. Lumbar back: He exhibits decreased range of motion, tenderness, bony tenderness and pain. He exhibits no swelling, no edema, no deformity, no laceration and normal pulse. Back:       Right upper arm: Normal.      Left upper arm: Normal.      Right forearm: Normal.      Left forearm: Normal.        Arms:       Right hand: He exhibits decreased range of motion and bony tenderness. He exhibits normal capillary refill, no deformity, no laceration and no swelling. Decreased sensation noted. Decreased sensation is present in the ulnar distribution, is present in the medial distribution and is present in the radial distribution. Decreased strength noted. He exhibits finger abduction, thumb/finger opposition and wrist extension trouble. Left hand: He exhibits decreased range of motion and bony tenderness. Decreased sensation noted. Decreased sensation is present in the ulnar distribution and is present in the radial distribution. Decreased strength noted. He exhibits finger abduction and wrist extension trouble. Right upper leg: Normal.      Left upper leg: Normal.      Right lower leg: Normal.      Left lower leg: Normal.        Legs:       Right foot: Normal.      Left foot: Normal.      Comments: Tender areas are indicated by numbered spot         Lymphadenopathy:      Cervical: No cervical adenopathy. Skin:     General: Skin is warm and dry. Coloration: Skin is not pale.       Findings: No abrasion, bruising, ecchymosis, erythema, laceration, petechiae or rash. Rash is not macular, pustular or urticarial.      Nails: There is no clubbing. Neurological:      Mental Status: He is alert and oriented to person, place, and time. Cranial Nerves: No cranial nerve deficit. Sensory: Sensory deficit present. Motor: No tremor, atrophy or abnormal muscle tone. Coordination: Coordination normal.      Gait: Gait abnormal.      Deep Tendon Reflexes: Reflexes abnormal. Babinski sign absent on the right side. Babinski sign absent on the left side. Reflex Scores:       Patellar reflexes are 1+ on the right side and 1+ on the left side. Achilles reflexes are 0 on the right side and 0 on the left side. Psychiatric:         Attention and Perception: He is attentive. Mood and Affect: Mood is not anxious or depressed. Affect is not labile, blunt, angry or inappropriate. Speech: He is communicative. Speech is not rapid and pressured, delayed, slurred or tangential.         Behavior: Behavior normal. Behavior is not agitated, slowed, aggressive, withdrawn, hyperactive or combative. Thought Content: Thought content normal. Thought content is not paranoid or delusional. Thought content does not include homicidal or suicidal ideation. Thought content does not include homicidal or suicidal plan. Cognition and Memory: Memory is not impaired. He does not exhibit impaired recent memory or impaired remote memory. Judgment: Judgment normal. Judgment is not impulsive or inappropriate. Comments: high score on SOAPPR    Calm appropriate    NAD       Ortho Exam  Neurologic Exam     Mental Status   Oriented to person, place, and time. Speech: not slurred     Cranial Nerves     CN III, IV, VI   Pupils are equal, round, and reactive to light.     Gait, Coordination, and Reflexes     Reflexes   Right patellar: 1+  Left patellar: 1+  Right achilles: 0  Left achilles: 0        After a thorough review and discussion of the previous medical records, patient comprehensive medical, surgical, and family and social history, Review of Systems, their OARRS, their Screener and Opioid Assessment for Patients with Pain (SOAPP®-R), recent diagnostics, and symptomatic results to previous treatment, it is my impression that the patients is suffering with progressive and severe:     Diagnosis Orders   1. Chronic low back pain, unspecified back pain laterality, unspecified whether sciatica present     2. High risk medication use - 12/07/17 OARRS PM&R, 02/08/18 OARRS PM&R, 10/05/17 Urine Drug Screen: negative PM&R, MED CONTRACT 1/17/17     3. Myalgia     4. Thoracic and lumbosacral neuritis     5. Vitamin D deficiency     6. Bilateral low back pain with sciatica, sciatica laterality unspecified, unspecified chronicity     7. Osteoarthritis of cervical spine, unspecified spinal osteoarthritis complication status     8. Osteopenia, unspecified location     9.  C6 radiculopathy         I am also concerned by lifestyle and mood issues including:    Past Medical History:   Diagnosis Date    Chronic back pain     Coronary artery disease 2002    [facility]. Sissy Kaiser [facility] Esophageal ulcer 3/10/2014    Dr. Ratna Barger    Gastric ulcer 3/10/2014    Dr. Ratna Barger    Gout     Hiatal hernia 3/10/2014    Dr. Seun Wallace Hyperlipidemia     Hypertension     Impotence 10/16/2020    MI (myocardial infarction) [facility]) 2005    Dr. Mukund Ragland Peripheral vascular disease ([facility] [address]     Prediabetes     Schizo-affective schizophrenia, in remission ([address] 2/1/2005    Overview:  followed at the [facility] Severe single current episode of major depressive disorder, without psychotic features ([address] 7/8/2016    Status post coronary artery stent placement 2002    Dr. Sissy Kaiser           Given their medication, chronic pain and lifestyle and medications they are at risk for :    Falls, constipation, addiction  Loss of livelyhood due to severe pain, debility, weight gain and  vitamin D deficiency    The patient was educated regarding proper diet, fitness routine, and regulatory restrictions concerning pain medications. Previous notes, comprehensive past medical, surgical, family history, and diagnostics were reviewed. Patient education and councelling were provided regarding off label use,treatment options and medication and injection risks. Current and old OARRS ([place] Automated Prescription Reporting System) records reviewed, all refills reviewed since last visit,  Behavioral agreement/[employer] regulations   and Toxicology screen was reviewed with patient and is up to date. There are no current red flags. They are making good progress regarding pain relief, they are performing at a functional level regarding activities of daily living, family interactions and psychological functioning, they're not having any adverse effects or side effects from the current medications, and I see no findings of aberrant drug taking or addiction related behaviors. The patient is aware that they have a chronic pain condition and they may require opiates dosing for life. All efforts will be made to wean to the lowest effective dose. Other therapies for pain have not been effective including nonopiate medications. Injections and exercises are only partially effective. A Rx for Narcan was offered to help prevent accidental overdose. RX Monitoring 5/18/2020   Attestation -   Periodic Controlled Substance Monitoring Possible medication side effects, risk of tolerance/dependence & alternative treatments discussed. ;No signs of potential drug abuse or diversion identified. ;Assessed functional status. ;Obtaining appropriate analgesic effect of treatment. Chronic Pain > 50 MEDD Re-evaluated the status of the patient's underlying condition causing pain. ;Considered consultation with a specialist.;Obtained noted.          Electrodiagnostic:  Previous studies requested,     I discussed results with patient. See follow-up plans for new studies.         Labs:  Previous labs reviewed     Lab Results   Component Value Date     09/25/2020    K 4.6 09/25/2020    CL 99 09/25/2020    CO2 30 09/25/2020    BUN 13 09/25/2020    CREATININE 0.88 09/25/2020    CALCIUM 9.1 09/25/2020    LABALBU 4.3 09/25/2020    LABALBU 4.6 05/01/2020    BILITOT 0.4 09/25/2020    ALKPHOS 64 09/25/2020    AST 27 09/25/2020    ALT 22 09/25/2020     Lab Results   Component Value Date    WBC 6.0 09/25/2020    RBC 4.44 09/25/2020    HGB 14.0 09/25/2020    HCT 42.5 09/25/2020    MCV 95.7 09/25/2020    MCH 31.5 09/25/2020    MCHC 32.9 09/25/2020    RDW 15.0 09/25/2020     09/25/2020    MPV 9.9 09/15/2015       Lab Results   Component Value Date    LABAMPH Neg 03/12/2020    BARBSCNU Neg 03/12/2020    LABBENZ Neg 03/12/2020    CANSU Neg 03/12/2020    COCAIMETSCRU Neg 03/12/2020    PHENCYCLIDINESCREENURINE Neg 63/07/7215    TRICYCLIC Negative 53/13/2303    DSCOMMENT see below 03/12/2020       Lab Results   Component Value Date    CODEINE Not Detected 09/16/2016    MORPHINE Not Detected 09/16/2016    ACETYLMORPHI Not Detected 09/16/2016    OXYCODONE Present 09/16/2016    NOROXYCODONE Present 09/16/2016    NOROXYMU Present 09/16/2016    HYDRCO Not Detected 09/16/2016    NORHYDU Not Detected 09/16/2016    HYDROMO Not Detected 09/16/2016    Fritzi Fromberg Not Detected 09/16/2016    Ellerslie Merlin Not Detected 09/16/2016    FENTA Not Detected 09/16/2016    NORFENT Not Detected 09/16/2016    MEPERIDINE Not Detected 09/16/2016    TAPENU Not Detected 09/16/2016    TAPOSULFUR Not Detected 09/16/2016    METHADONE Not Detected 09/16/2016    LABPROP Not Detected 09/16/2016    TRAM Not Detected 09/16/2016    AMPH Not Detected 09/16/2016    METHAMP Not Detected 09/16/2016    MDMA Not Detected 09/16/2016    ECMDA Not Detected 09/16/2016       Lab Results   Component dosing during the rainy months,   Education was given on:   Dietary and Fitness--daily stretches and low carb diet-in chair Yoga when possible             Follow up with Primary Care Physician regarding their general medical needs. Stressed the importance of following up with PCP and specialists for his/her chronic diseases, health, CV, and cancer screening and continued care. Will follow disease activity/progression and adjust therapeutic regimen to disease activity and severity. Discussed medication dosage, usage, goals of therapy, and side effects. Available test results were reviewed -Discussed findings, impression and plan with patient. An additional 20 minutes were spent outside of the patient visit to review records. Additional time spent with the patient to discuss their questions. Additional time spent with the patient devoted to discussing treatment strategy, planning, and implementation. Patient understands above plan; questions asked and answered. Patient agrees to plan as noted above. F/U in 2-3months  At least 50% of the visit was involved in the discussion of the options for treatment. We discussed exercises, medication, interventional therapies and surgery. Healthy life style is essential with patient hard work to achieve the wellness. In addition; discussion with the patient and/or family about any of the diagnostic results, impressions and/or recommended diagnostic studies, prognosis, risks and benefits of treatment options, instructions for treatment and/or follow-up, importance of compliance with chosen treatment options, risk-factor reduction, and patient/family education. They are to follow up in 2 months to review medication, efficacy of injections, pill counts, OARRS check, SOAPPR assessment, review diagnostics, to review previous and future treatment plans and assess appropriateness for continued therapy.         New Diagnostics  No orders of the defined types were placed in this encounter.       Anaid Montenegro, DO

## 2020-11-05 DIAGNOSIS — M79.602 PAIN IN INFERIOR LEFT UPPER EXTREMITY: ICD-10-CM

## 2020-11-12 RX ORDER — IBUPROFEN 800 MG/1
800 TABLET ORAL
Qty: 30 TAB | Refills: 1 | OUTPATIENT
Start: 2020-11-12 | End: 2022-06-19

## 2020-11-12 RX ORDER — CYCLOBENZAPRINE HCL 10 MG
10 TABLET ORAL
Qty: 30 TAB | Refills: 0 | Status: SHIPPED | OUTPATIENT
Start: 2020-11-12

## 2020-11-17 ENCOUNTER — HOSPITAL ENCOUNTER (OUTPATIENT)
Dept: PHYSICAL THERAPY | Age: 45
Discharge: HOME | End: 2020-11-17
Payer: COMMERCIAL

## 2020-11-17 PROCEDURE — 97110 THERAPEUTIC EXERCISES: CPT

## 2020-11-17 PROCEDURE — 97162 PT EVAL MOD COMPLEX 30 MIN: CPT
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
## 2020-11-23 ENCOUNTER — HOSPITAL ENCOUNTER (OUTPATIENT)
Dept: PHYSICAL THERAPY | Age: 45
Discharge: HOME | End: 2020-11-23
Payer: COMMERCIAL

## 2020-11-23 PROCEDURE — 97140 MANUAL THERAPY 1/> REGIONS: CPT | Performed by: PHYSICAL THERAPIST

## 2020-11-23 PROCEDURE — 97110 THERAPEUTIC EXERCISES: CPT | Performed by: PHYSICAL THERAPIST

## 2020-11-23 NOTE — PROGRESS NOTES
[facility]  [address], [address] [facility]    OUTPATIENT PHYSICAL THERAPY DAILY TREATMENT NOTE  VISIT: 2    NAME: Americo Farley AGE: 41 y.o. GENDER: male  DATE: 11/23/2020  REFERRING PHYSICIAN: Tong Wilson MD      GOALS  Short term goals  Time frame: 6 visits  1. Patient will be compliant and independent with the initial HEP as evidenced by being able to perform without cuing. 2. Patient will report a 25% improvement in symptoms. 3. Patient report a 25% improvement in sleeping. 4. Patient will have a 15 degree increase in L cervical sidebend ROM to allow driving. 5. Patient will have an increased tolerance for sitting to allow 60 minutes of the activity before symptoms start. 6. Patient will tolerate 60 minutes of clinic activities before onset of symptoms.      Long term goals  Time frame: 12 visits  1. Patient will report pain level decrease to 4/10 to allow increased ease of movement. 2. Patient will have an improved score on the NDI outcome measure by 9 points to demonstrate an increase in functional activity tolerance. 3. Patient will be independent in final individualized HEP. 4. Patient will have an increase in cervical ROM to [facility] to allow performance of work and home chores tasks. 5. Patient will have an increase in postural strength to allow performance of walking > 1 mile. 6. Patient will return to work without being limited by symptoms. 7. Patient will sleep 6-8 hours without being interrupted by pain. SUBJECTIVE:   \"It's sore on both sides of my neck. Not as bad in my back\"    Pain In: 10/10 neck and 7-8/10 low back    OBJECTIVE DATA SUMMARY:   Objective data from initial evaluation:   EXAMINATION/PRESENTATION/DECISION MAKING:   Pain:  Location:  Neck Pain, L shoulder pain, L sided low back pain.  Reports L neck/shoulder area is the worse  Quality: aching, numbing, sharp and tingling  Now: 9  Best: 7  Worst: 10  Factors that improve pain: rest, heat, medication: flexeril and ibuprofen used but not effective     Posture:  Slumped sitting, rounded shoulder L>R, forward head . L arm guarded  Other Observations:  Patient eating mentos throughout session for pain control  Gait and Functional Mobility:  Increased time and effort to doff/don sweatshirt  Palpation: Very TTP over Cervical L paraspinals, L levators, L upper trap. Moderate tenderness over L deltoid, very TTP over L pec minor and major. Mild TTP over thoracic paraspinal,mid trap and rhomboids.                                                     Cervical AROM:                                                                       R                      L                        Flexion                                     50                    --                        Extension                                25                    --                        Side Bending                           40                    26                      Rotation                                   66                    43                         Bilat shoulder AROM  WFL bilat flex, abd, IR/ER, pain in L shoulder and lateral trunk     Lumbar ROM  Flexion: touches shoe laces  Extension: WFL  SB ing: L finger to top of patella, R finger to knee jt line  Rotation: 25% limited bilat  * pain with all ranges     UPPER QUARTER                             MUSCLE STRENGTH                                                  R                      L  Shoulder Flex                        5                      4 p! Shoulder Abduction                5                      4 p!   Biceps                                     5                      5                        Triceps                                    5                      4                                                     5                      4+                Mobility Assessment: C/S joint mobility WFL C7-C2, mild-mod pain with testing and reports of L arm pain down the the elbow but not along any specific dermatome. Neurological: Reflexes / Sensations: non specific tingling on the L arm down to hand. Denies headache. Balance WNL.    Special Tests: Cervical Distraction: pos decr neck pain only                       Cervical Compression: pos incr neck pain only                          Spurling Test: pos                        TREATMENT/INTERVENTION:  Modalities (Rationale): to decrease pain and muscle tension  MHP to neck and low back for 5 minutes in supine at end of session; no skin irritation noted     Therapeutic Exercises: to develop strength, endurance, range of motion, and flexibility  Initiated HEP of: UT stretch; levator scap stretch; posterior shoulder rolls; scapular squeezes  Added to HEP 2020: child's pose (forward and side to side); angry cat stretch; cervical nodding; LTR; supine piriformis stretch    Exercises in BOLD performed this date:     Seated:  Upper trap stretch: 30 sec holds  Levator stretch: 30 sec holds  Cervical noddin reps B  Posterior shoulder rolls: 10 reps  Scapular squeezes: 10 reps  Triceps stretch: 30 sec holds  Lower cervical/upper thoracic stretch: 2 reps with 15 sec holds    Supine:   LTR: 10 reps  Piriformis stretch: 30 sec holds B  Figure 4 stretch: 30 sec holds    Quadruped:   Child's pose: 30 sec holds (forward and side to side)  Angry cat stretch: 10 reps    Standing:   Self STM to left UT, rhomboids, infraspinatus with tennis ball    Manual Therapy: for joint mobilization/manipulations and soft tissue mobilization (25 minutes)  STM to bilateral UT, levator scapulae, and cervical paraspinals  Instrument assisted soft tissue mobilization to bilateral UT and levator scapulae. Patient educated on purpose and affect of intervention with good understanding verbalized.    Manual cervical traction in supine x3 with 30 sec holds  Cervical spine rotation and SB PROM  STM to bilateral lumbar paraspinals in prone      Neuro Re-Education: to improve movement, balance, coordination, kinesthetic sense, posture, and proprioception for sitting or standing balance  Educated on importance of upright posture on muscle balance and decreasing strain of certain muscles when sitting at a computer at work. Patient demonstrated understanding. Educated on towel roll for cervical support when sleeping     Activity tolerance and post treatment pain report:   Good/Fair  Pain Out: 8/10 neck    Education:  Education was provided to the patient on the following topics: continue HEP in pain free ROM. []    No changes were made to the home exercise program.  [x]    The following changes were made to the home exercise program: added to HEP (see above)  Patient verbalized understanding of the topics presented. ASSESSMENT:   Patient returns following initial evaluation. Patient presents with continued high pain levels at neck, and decreased bilateral low back pain. Patient experiences the most stiffness and tightness in his back in the AM. Patient's neck pain increases as day goes on, and makes sleeping difficult. Patient educated on use of towel roll for cervical support with sleeping. Patient most tender at left UT and levator scapulae. Patient with fairly regular performance of HEP. Patient tolerated clinic exercises and manual therapy. Added to HEP this date with good understanding verbalized. Pain reduction at end of session. Patients progression toward goals is as follows:  [x]     Improving appropriately and progressing toward goals  []     Improving slowly and progressing toward goals  []     Not making progress toward goals and plan of care will be adjusted    PLAN OF CARE:   Patient continues to benefit from skilled intervention to address the above impairments. [x]    Continue treatment per established plan of care.   []     Recommend the following changes to the plan of care    Recommendations/Intent for next treatment: check in about HEP    Liv Dominique PT   Time Calculation: 68 mins  Patient Time in clinic:   Start Time: 1332   Stop Time: 1440

## 2020-11-30 ENCOUNTER — HOSPITAL ENCOUNTER (OUTPATIENT)
Dept: PHYSICAL THERAPY | Age: 45
Discharge: HOME | End: 2020-11-30
Payer: COMMERCIAL

## 2020-11-30 PROCEDURE — 97110 THERAPEUTIC EXERCISES: CPT | Performed by: PHYSICAL THERAPIST

## 2020-11-30 PROCEDURE — 97140 MANUAL THERAPY 1/> REGIONS: CPT | Performed by: PHYSICAL THERAPIST

## 2020-11-30 NOTE — PROGRESS NOTES
[facility]  [address], [address] [facility]    OUTPATIENT PHYSICAL THERAPY DAILY TREATMENT NOTE  VISIT: 3    NAME: Jassi Arias AGE: 41 y.o. GENDER: male  DATE: 11/30/2020  REFERRING PHYSICIAN: Rodriguez Orona MD      GOALS  Short term goals  Time frame: 6 visits  1. Patient will be compliant and independent with the initial HEP as evidenced by being able to perform without cuing. 2. Patient will report a 25% improvement in symptoms. 3. Patient report a 25% improvement in sleeping. 4. Patient will have a 15 degree increase in L cervical sidebend ROM to allow driving. 5. Patient will have an increased tolerance for sitting to allow 60 minutes of the activity before symptoms start. 6. Patient will tolerate 60 minutes of clinic activities before onset of symptoms.      Long term goals  Time frame: 12 visits  1. Patient will report pain level decrease to 4/10 to allow increased ease of movement. 2. Patient will have an improved score on the NDI outcome measure by 9 points to demonstrate an increase in functional activity tolerance. 3. Patient will be independent in final individualized HEP. 4. Patient will have an increase in cervical ROM to [facility] to allow performance of work and home chores tasks. 5. Patient will have an increase in postural strength to allow performance of walking > 1 mile. 6. Patient will return to work without being limited by symptoms. 7. Patient will sleep 6-8 hours without being interrupted by pain. SUBJECTIVE:   \"It's feeling better. \"    Pain In: 7-8/10 neck and 4-5/10 low back    OBJECTIVE DATA SUMMARY:   Objective data from initial evaluation:   EXAMINATION/PRESENTATION/DECISION MAKING:   Pain:  Location:  Neck Pain, L shoulder pain, L sided low back pain.  Reports L neck/shoulder area is the worse  Quality: aching, numbing, sharp and tingling  Now: 9  Best: 7  Worst: 10  Factors that improve pain: rest, heat, medication: flexeril and ibuprofen used but not effective     Posture:  Slumped sitting, rounded shoulder L>R, forward head . L arm guarded  Other Observations:  Patient eating mentos throughout session for pain control  Gait and Functional Mobility:  Increased time and effort to doff/don sweatshirt  Palpation: Very TTP over Cervical L paraspinals, L levators, L upper trap. Moderate tenderness over L deltoid, very TTP over L pec minor and major. Mild TTP over thoracic paraspinal,mid trap and rhomboids.                                                     Cervical AROM:                                                                       R                      L                        Flexion                                     50                    --                        Extension                                25                    --                        Side Bending                           40                    26                      Rotation                                   66                    43                         Bilat shoulder AROM  WFL bilat flex, abd, IR/ER, pain in L shoulder and lateral trunk     Lumbar ROM  Flexion: touches shoe laces  Extension: WFL  SB ing: L finger to top of patella, R finger to knee jt line  Rotation: 25% limited bilat  * pain with all ranges     UPPER QUARTER                             MUSCLE STRENGTH                                                  R                      L  Shoulder Flex                        5                      4 p! Shoulder Abduction                5                      4 p!   Biceps                                     5                      5                        Triceps                                    5                      4                                                     5                      4+                Mobility Assessment: C/S joint mobility WFL C7-C2, mild-mod pain with testing and reports of L arm pain down the the elbow but not along any specific dermatome. Neurological: Reflexes / Sensations: non specific tingling on the L arm down to hand. Denies headache. Balance WNL.       Special Tests: Cervical Distraction: pos decr neck pain only                       Cervical Compression: pos incr neck pain only                          Spurling Test: pos                        TREATMENT/INTERVENTION:  Modalities (Rationale): to decrease pain and muscle tension  Cold pack to left side of neck for 5 minutes in sitting at end of session; no skin irritation noted     Therapeutic Exercises: to develop strength, endurance, range of motion, and flexibility  Initiated HEP of: UT stretch; levator scap stretch; posterior shoulder rolls; scapular squeezes  Added to HEP 2020: child's pose (forward and side to side); angry cat stretch; cervical nodding; LTR; supine piriformis stretch    Exercises in BOLD performed this date:     Seated:  Upper trap stretch: 2 reps with 30 sec holds  Levator stretch: 2 reps with 30 sec holds  Cervical noddin reps B  Posterior shoulder rolls: 10 reps  Scapular squeezes: 10 reps  Triceps stretch: 30 sec holds  Posterior capsule stretch: 30 sec holds  Lower cervical/upper thoracic stretch: 2 reps with 15 sec holds  Forward flexion over blue physioball: 10 reps; 5 reps side to side  Hamstring stretch: 30 sec holds B  Piriformis stretch: 30 sec holds B     Supine:   LTR: 10 reps  Piriformis stretch: 30 sec holds B  Figure 4 stretch: 30 sec holds  Hooklying marches: 4 reps B  Hamstring stretch: 30 sec holds B (PROM)    Quadruped:   Child's pose: 30 sec holds (forward and side to side)  Angry cat stretch: 8 reps    Standing:   Self STM to left UT, rhomboids, infraspinatus with tennis ball  Self STM to bilateral thoracic and lumbar paraspinals with tennis ball   Shoulder flexion to 90 degrees, 2#: 10 reps  Overhead press 2#: 10 reps   Rows with green TB: 10 reps  Triceps pull downs with green TB: 10 reps     Manual Therapy: for joint mobilization/manipulations and soft tissue mobilization  STM to bilateral UT, levator scapulae, and cervical paraspinals  Instrument assisted soft tissue mobilization to bilateral UT and levator scapulae. Patient educated on purpose and affect of intervention with good understanding verbalized. Manual cervical traction in supine x3 with 30 sec holds  Cervical spine rotation and SB PROM  STM to bilateral lumbar paraspinals in prone  Lumbar gapping in sidelying; cavitation and pain relief  PA mobs to lumbar spine, grade 1,2      Neuro Re-Education: to improve movement, balance, coordination, kinesthetic sense, posture, and proprioception for sitting or standing balance  Educated on importance of upright posture on muscle balance and decreasing strain of certain muscles when sitting at a computer at work. Patient demonstrated understanding. Educated on towel roll for cervical support when sleeping     Activity tolerance and post treatment pain report:   Good  Pain Out: 5/10 neck; 4/10 back    Education:  Education was provided to the patient on the following topics: continue HEP in pain free ROM. [x]    No changes were made to the home exercise program.  []    The following changes were made to the home exercise program  Patient verbalized understanding of the topics presented. ASSESSMENT:   Patient presents with decreased pain in neck and low back. Left sided neck pain remains chief complaint. He remains most tender at left UT and levator scapulae. Improvement in sleeping quality. Patient with excellent adherence to HEP. Patient tolerated clinic exercises and manual therapy well this date. Good participation and motivation noted.      Patients progression toward goals is as follows:  [x]     Improving appropriately and progressing toward goals  []     Improving slowly and progressing toward goals  []     Not making progress toward goals and plan of care will be adjusted    PLAN OF CARE:   Patient continues to benefit from skilled intervention to address the above impairments. [x]    Continue treatment per established plan of care.   []     Recommend the following changes to the plan of care    Recommendations/Intent for next treatment: progress shoulder and scapular strengthening     Edouard Uriarte, PT   Time Calculation: 61 mins  Patient Time in clinic:   Start Time: 1330   Stop Time: [phone]

## 2020-12-02 ENCOUNTER — HOSPITAL ENCOUNTER (OUTPATIENT)
Dept: PHYSICAL THERAPY | Age: 45
Discharge: HOME | End: 2020-12-02
Payer: COMMERCIAL

## 2020-12-02 PROCEDURE — 97140 MANUAL THERAPY 1/> REGIONS: CPT | Performed by: PHYSICAL THERAPIST

## 2020-12-02 PROCEDURE — 97110 THERAPEUTIC EXERCISES: CPT | Performed by: PHYSICAL THERAPIST

## 2020-12-02 NOTE — PROGRESS NOTES
[facility]  [address], [address] [facility]    OUTPATIENT PHYSICAL THERAPY DAILY TREATMENT NOTE  VISIT: 4    NAME: Roberto Lane AGE: 41 y.o. GENDER: male  DATE: 12/2/2020  REFERRING PHYSICIAN: Lola Gotti MD      GOALS  Short term goals  Time frame: 6 visits  1. Patient will be compliant and independent with the initial HEP as evidenced by being able to perform without cuing. 2. Patient will report a 25% improvement in symptoms. 3. Patient report a 25% improvement in sleeping. 4. Patient will have a 15 degree increase in L cervical sidebend ROM to allow driving. 5. Patient will have an increased tolerance for sitting to allow 60 minutes of the activity before symptoms start. 6. Patient will tolerate 60 minutes of clinic activities before onset of symptoms.      Long term goals  Time frame: 12 visits  1. Patient will report pain level decrease to 4/10 to allow increased ease of movement. 2. Patient will have an improved score on the NDI outcome measure by 9 points to demonstrate an increase in functional activity tolerance. 3. Patient will be independent in final individualized HEP. 4. Patient will have an increase in cervical ROM to [facility] to allow performance of work and home chores tasks. 5. Patient will have an increase in postural strength to allow performance of walking > 1 mile. 6. Patient will return to work without being limited by symptoms. 7. Patient will sleep 6-8 hours without being interrupted by pain. SUBJECTIVE:   \"The neck is still worse than the back\"    Pain In: 6/10 neck and 3/10 low back    OBJECTIVE DATA SUMMARY:   Objective data from initial evaluation:   EXAMINATION/PRESENTATION/DECISION MAKING:   Pain:  Location:  Neck Pain, L shoulder pain, L sided low back pain.  Reports L neck/shoulder area is the worse  Quality: aching, numbing, sharp and tingling  Now: 9  Best: 7  Worst: 10  Factors that improve pain: rest, heat, medication: flexeril and ibuprofen used but not effective     Posture:  Slumped sitting, rounded shoulder L>R, forward head . L arm guarded  Other Observations:  Patient eating mentos throughout session for pain control  Gait and Functional Mobility:  Increased time and effort to doff/don sweatshirt  Palpation: Very TTP over Cervical L paraspinals, L levators, L upper trap. Moderate tenderness over L deltoid, very TTP over L pec minor and major. Mild TTP over thoracic paraspinal,mid trap and rhomboids.                                                     Cervical AROM:                                                                       R                      L                        Flexion                                     50                    --                        Extension                                25                    --                        Side Bending                           40                    26                      Rotation                                   66                    43                         Bilat shoulder AROM  WFL bilat flex, abd, IR/ER, pain in L shoulder and lateral trunk     Lumbar ROM  Flexion: touches shoe laces  Extension: WFL  SB ing: L finger to top of patella, R finger to knee jt line  Rotation: 25% limited bilat  * pain with all ranges     UPPER QUARTER                             MUSCLE STRENGTH                                                  R                      L  Shoulder Flex                        5                      4 p! Shoulder Abduction                5                      4 p!   Biceps                                     5                      5                        Triceps                                    5                      4                                                     5                      4+                Mobility Assessment: C/S joint mobility WFL C7-C2, mild-mod pain with testing and reports of L arm pain down the the elbow but not along any specific dermatome. Neurological: Reflexes / Sensations: non specific tingling on the L arm down to hand. Denies headache. Balance WNL.       Special Tests: Cervical Distraction: pos decr neck pain only                       Cervical Compression: pos incr neck pain only                          Spurling Test: pos                        TREATMENT/INTERVENTION:  Modalities (Rationale): to decrease pain and muscle tension  MHP to left side of neck for 5 minutes in sitting at end of session; no skin irritation noted     Therapeutic Exercises: to develop strength, endurance, range of motion, and flexibility  Initiated HEP of: UT stretch; levator scap stretch; posterior shoulder rolls; scapular squeezes  Added to HEP 2020: child's pose (forward and side to side); angry cat stretch; cervical nodding; LTR; supine piriformis stretch    Exercises in BOLD performed this date:     UBE/LBE: 5 minutes, level 1 for warm-up    Seated:  Upper trap stretch: 2 reps with 30 sec holds  Levator stretch: 2 reps with 30 sec holds  Cervical noddin reps B  Posterior shoulder rolls: 10 reps  Scapular squeezes: 10 reps  Triceps stretch: 30 sec holds  Posterior capsule stretch: 30 sec holds  Lower cervical/upper thoracic stretch: 2 reps with 15 sec holds  Cervical rotation self mob with towel: 5 reps B  Cervical extension self mob with towel: 10 reps  Forward flexion over blue physioball: 10 reps; 5 reps side to side  Hamstring stretch: 30 sec holds B  Piriformis stretch: 30 sec holds B     Supine:   LTR: 10 reps  SKTC: 3 reps with 5 sec holds B  Piriformis stretch: 30 sec holds B  Figure 4 stretch: 30 sec holds  Hooklying marches: 4 reps B  Hamstring stretch: 30 sec holds B (PROM)    Quadruped:   Child's pose: 30 sec holds (forward and side to side)  Angry cat stretch: 8 reps    Standing:   Self STM to left UT, rhomboids, infraspinatus with tennis ball  Self STM to bilateral thoracic and lumbar paraspinals with tennis ball   Shoulder flexion to 90 degrees, 2#: 10 reps  Overhead press 2#: 10 reps   Rows with green TB: 10 reps   Triceps pull downs with green TB: 10 reps     Manual Therapy: for joint mobilization/manipulations and soft tissue mobilization  STM to bilateral UT, levator scapulae, and cervical paraspinals  Instrument assisted soft tissue mobilization to bilateral UT and levator scapulae. Patient educated on purpose and affect of intervention with good understanding verbalized. Manual cervical traction in supine x3 with 30 sec holds  Cervical spine rotation and SB PROM  STM to bilateral lumbar paraspinals in prone  Lumbar gapping in sidelying; cavitation and pain relief  PA mobs to lumbar spine, grade 1,2      Neuro Re-Education: to improve movement, balance, coordination, kinesthetic sense, posture, and proprioception for sitting or standing balance  Educated on importance of upright posture on muscle balance and decreasing strain of certain muscles when sitting at a computer at work. Patient demonstrated understanding. Educated on towel roll for cervical support when sleeping     Activity tolerance and post treatment pain report:   Good  Pain Out: 5/10 neck; 2/10 back    Education:  Education was provided to the patient on the following topics: continue HEP in pain free ROM. [x]    No changes were made to the home exercise program.  []    The following changes were made to the home exercise program  Patient verbalized understanding of the topics presented. ASSESSMENT:   Patient presents with decreased pain in neck and low back. Left sided neck pain remains chief complaint. He remains most tender at left UT and levator scapulae. Improvement in sleeping quality. Patient with excellent adherence to HEP. Patient tolerated clinic exercises and manual therapy well this date. He tolerated additional exercises from last session well.  Good participation and motivation noted. Patients progression toward goals is as follows:  [x]     Improving appropriately and progressing toward goals  []     Improving slowly and progressing toward goals  []     Not making progress toward goals and plan of care will be adjusted    PLAN OF CARE:   Patient continues to benefit from skilled intervention to address the above impairments. [x]    Continue treatment per established plan of care.   []     Recommend the following changes to the plan of care    Recommendations/Intent for next treatment: progress shoulder and scapular strengthening     Alia Garcia, PT   Time Calculation: 44 mins  Patient Time in clinic:   Start Time: 1326   Stop Time: 1410

## 2020-12-09 ENCOUNTER — HOSPITAL ENCOUNTER (OUTPATIENT)
Dept: PHYSICAL THERAPY | Age: 45
Discharge: HOME | End: 2020-12-09
Payer: COMMERCIAL

## 2020-12-09 PROCEDURE — 97110 THERAPEUTIC EXERCISES: CPT | Performed by: PHYSICAL THERAPIST

## 2020-12-09 PROCEDURE — 97140 MANUAL THERAPY 1/> REGIONS: CPT | Performed by: PHYSICAL THERAPIST

## 2020-12-09 NOTE — PROGRESS NOTES
[facility]  [address], [address] [facility]    OUTPATIENT PHYSICAL THERAPY DAILY TREATMENT NOTE  VISIT: 5    NAME: Rhonda Hirsch AGE: 41 y.o. GENDER: male  DATE: 12/9/2020  REFERRING PHYSICIAN: Radha Atkins MD      GOALS  Short term goals  Time frame: 6 visits  1. Patient will be compliant and independent with the initial HEP as evidenced by being able to perform without cuing. 2. Patient will report a 25% improvement in symptoms. 3. Patient report a 25% improvement in sleeping. 4. Patient will have a 15 degree increase in L cervical sidebend ROM to allow driving. 5. Patient will have an increased tolerance for sitting to allow 60 minutes of the activity before symptoms start. 6. Patient will tolerate 60 minutes of clinic activities before onset of symptoms.      Long term goals  Time frame: 12 visits  1. Patient will report pain level decrease to 4/10 to allow increased ease of movement. 2. Patient will have an improved score on the NDI outcome measure by 9 points to demonstrate an increase in functional activity tolerance. 3. Patient will be independent in final individualized HEP. 4. Patient will have an increase in cervical ROM to [facility] to allow performance of work and home chores tasks. 5. Patient will have an increase in postural strength to allow performance of walking > 1 mile. 6. Patient will return to work without being limited by symptoms. 7. Patient will sleep 6-8 hours without being interrupted by pain. SUBJECTIVE:   \"It's coming along. \"    Pain In: 5/10 neck and 3/10 low back    OBJECTIVE DATA SUMMARY:   Objective data from initial evaluation:   EXAMINATION/PRESENTATION/DECISION MAKING:   Pain:  Location:  Neck Pain, L shoulder pain, L sided low back pain.  Reports L neck/shoulder area is the worse  Quality: aching, numbing, sharp and tingling  Now: 9  Best: 7  Worst: 10  Factors that improve pain: rest, heat, medication: flexeril and ibuprofen used but not effective     Posture:  Slumped sitting, rounded shoulder L>R, forward head . L arm guarded  Other Observations:  Patient eating mentos throughout session for pain control  Gait and Functional Mobility:  Increased time and effort to doff/don sweatshirt  Palpation: Very TTP over Cervical L paraspinals, L levators, L upper trap. Moderate tenderness over L deltoid, very TTP over L pec minor and major. Mild TTP over thoracic paraspinal,mid trap and rhomboids.                                                     Cervical AROM:                                                                       R                      L                        Flexion                                     50                    --                        Extension                                25                    --                        Side Bending                           40                    26                      Rotation                                   66                    43                         Bilat shoulder AROM  WFL bilat flex, abd, IR/ER, pain in L shoulder and lateral trunk     Lumbar ROM  Flexion: touches shoe laces  Extension: WFL  SB ing: L finger to top of patella, R finger to knee jt line  Rotation: 25% limited bilat  * pain with all ranges     UPPER QUARTER                             MUSCLE STRENGTH                                                  R                      L  Shoulder Flex                        5                      4 p! Shoulder Abduction                5                      4 p!   Biceps                                     5                      5                        Triceps                                    5                      4                                                     5                      4+                Mobility Assessment: C/S joint mobility WFL C7-C2, mild-mod pain with testing and reports of L arm pain down the the elbow but not along any specific dermatome. Neurological: Reflexes / Sensations: non specific tingling on the L arm down to hand. Denies headache. Balance WNL.       Special Tests: Cervical Distraction: pos decr neck pain only                       Cervical Compression: pos incr neck pain only                          Spurling Test: pos                        TREATMENT/INTERVENTION:  Modalities (Rationale): to decrease pain and muscle tension  MHP to neck and low back for 7 minutes in supine at end of session; no skin irritation noted     Therapeutic Exercises: to develop strength, endurance, range of motion, and flexibility  Initiated HEP of: UT stretch; levator scap stretch; posterior shoulder rolls; scapular squeezes  Added to HEP 2020: child's pose (forward and side to side); angry cat stretch; cervical nodding; LTR; supine piriformis stretch    Exercises in BOLD performed this date:     UBE/LBE: 5 minutes, level 1 for warm-up    Seated:  Upper trap stretch: 2 reps with 30 sec holds  Levator stretch: 2 reps with 30 sec holds  Cervical noddin reps B  Posterior shoulder rolls: 10 reps  Scapular squeezes: 10 reps  Triceps stretch: 30 sec holds  Posterior capsule stretch: 30 sec holds  Lower cervical/upper thoracic stretch: 3 reps with 15 sec holds  Cervical rotation self mob with towel: 5 reps B  Cervical extension self mob with towel: 10 reps  Forward flexion over blue physioball: 10 reps; 5 reps side to side  Trunk extension stretch: 10 reps   Hamstring stretch: 30 sec holds B  Piriformis stretch: 30 sec holds B     Supine:   LTR: 10 reps  SKTC: 3 reps with 5 sec holds B  Piriformis stretch: 30 sec holds B  Figure 4 stretch: 30 sec holds  Hooklying marches: 4 reps B  Hamstring stretch: 30 sec holds B (PROM)  Rolling out upper back over blue foam: 30 seconds     Quadruped:   Child's pose: 30 sec holds (forward and side to side)  Angry cat stretch: 8 reps    Standing: Self STM to left UT, rhomboids, infraspinatus with tennis ball  Self STM to bilateral thoracic and lumbar paraspinals with tennis ball   Shoulder flexion to 90 degrees, 2#: 10 reps  Overhead press 2#: 10 reps   Rows with green TB: 10 reps   Triceps pull downs with green TB: 10 reps     Manual Therapy: for joint mobilization/manipulations and soft tissue mobilization  STM to bilateral UT, levator scapulae, and cervical paraspinals  Instrument assisted soft tissue mobilization to left UT and levator scapulae. Patient educated on purpose and affect of intervention with good understanding verbalized. Manual cervical traction in supine x3 with 30 sec holds  Cervical spine rotation and SB PROM  STM to bilateral lumbar paraspinals in prone  Lumbar gapping in sidelying; cavitation and pain relief  PA mobs to lumbar spine, grade 1,2      Neuro Re-Education: to improve movement, balance, coordination, kinesthetic sense, posture, and proprioception for sitting or standing balance  Educated on importance of upright posture on muscle balance and decreasing strain of certain muscles when sitting at a computer at work. Patient demonstrated understanding. Educated on towel roll for cervical support when sleeping     Activity tolerance and post treatment pain report:   Good  Pain Out: 5/10 neck; 2/10 back    Education:  Education was provided to the patient on the following topics: continue HEP in pain free ROM. [x]    No changes were made to the home exercise program.  []    The following changes were made to the home exercise program  Patient verbalized understanding of the topics presented. ASSESSMENT:   Patient presents with decreased pain in neck and low back. Left sided neck pain remains chief complaint. He remains most tender at left UT and levator scapulae. Improvement in sleeping quality. Patient with excellent adherence to HEP at home and work.  Patient tolerated clinic exercises and manual therapy well this date.    Patients progression toward goals is as follows:  [x]     Improving appropriately and progressing toward goals  []     Improving slowly and progressing toward goals  []     Not making progress toward goals and plan of care will be adjusted    PLAN OF CARE:   Patient continues to benefit from skilled intervention to address the above impairments. [x]    Continue treatment per established plan of care.   []     Recommend the following changes to the plan of care    Recommendations/Intent for next treatment: progress shoulder and scapular strengthening     Arcenio Lamas PT   Time Calculation: 35 mins  Patient Time in clinic:   Start Time: 1400   Stop Time: [phone]

## 2020-12-14 ENCOUNTER — HOSPITAL ENCOUNTER (OUTPATIENT)
Dept: PHYSICAL THERAPY | Age: 45
Discharge: HOME | End: 2020-12-14
Payer: COMMERCIAL

## 2020-12-14 PROCEDURE — 97140 MANUAL THERAPY 1/> REGIONS: CPT | Performed by: PHYSICAL THERAPIST

## 2020-12-14 PROCEDURE — 97110 THERAPEUTIC EXERCISES: CPT | Performed by: PHYSICAL THERAPIST

## 2020-12-14 NOTE — PROGRESS NOTES
[facility]  [address], [address] [facility]    OUTPATIENT PHYSICAL THERAPY DAILY TREATMENT NOTE  VISIT: 6    NAME: Dena Flores AGE: 41 y.o. GENDER: male  DATE: 12/14/2020  REFERRING PHYSICIAN: Mau Castillo MD      GOALS  Short term goals  Time frame: 6 visits  1. Patient will be compliant and independent with the initial HEP as evidenced by being able to perform without cuing. 2. Patient will report a 25% improvement in symptoms. 3. Patient report a 25% improvement in sleeping. 4. Patient will have a 15 degree increase in L cervical sidebend ROM to allow driving. 5. Patient will have an increased tolerance for sitting to allow 60 minutes of the activity before symptoms start. 6. Patient will tolerate 60 minutes of clinic activities before onset of symptoms.      Long term goals  Time frame: 12 visits  1. Patient will report pain level decrease to 4/10 to allow increased ease of movement. 2. Patient will have an improved score on the NDI outcome measure by 9 points to demonstrate an increase in functional activity tolerance. 3. Patient will be independent in final individualized HEP. 4. Patient will have an increase in cervical ROM to [facility] to allow performance of work and home chores tasks. 5. Patient will have an increase in postural strength to allow performance of walking > 1 mile. 6. Patient will return to work without being limited by symptoms. 7. Patient will sleep 6-8 hours without being interrupted by pain. SUBJECTIVE:   \"It's feeling pretty good. \"    Pain In: 4/10 neck and 2-3/10 low back    OBJECTIVE DATA SUMMARY:   Objective data from initial evaluation:   EXAMINATION/PRESENTATION/DECISION MAKING:   Pain:  Location:  Neck Pain, L shoulder pain, L sided low back pain.  Reports L neck/shoulder area is the worse  Quality: aching, numbing, sharp and tingling  Now: 9  Best: 7  Worst: 10  Factors that improve pain: rest, heat, medication: flexeril and ibuprofen used but not effective     Posture:  Slumped sitting, rounded shoulder L>R, forward head . L arm guarded  Other Observations:  Patient eating mentos throughout session for pain control  Gait and Functional Mobility:  Increased time and effort to doff/don sweatshirt  Palpation: Very TTP over Cervical L paraspinals, L levators, L upper trap. Moderate tenderness over L deltoid, very TTP over L pec minor and major. Mild TTP over thoracic paraspinal,mid trap and rhomboids.                                                     Cervical AROM:                                                                       R                      L                        Flexion                                     50                    --                        Extension                                25                    --                        Side Bending                           40                    26                      Rotation                                   66                    43                         Bilat shoulder AROM  WFL bilat flex, abd, IR/ER, pain in L shoulder and lateral trunk     Lumbar ROM  Flexion: touches shoe laces  Extension: WFL  SB ing: L finger to top of patella, R finger to knee jt line  Rotation: 25% limited bilat  * pain with all ranges     UPPER QUARTER                             MUSCLE STRENGTH                                                  R                      L  Shoulder Flex                        5                      4 p! Shoulder Abduction                5                      4 p!   Biceps                                     5                      5                        Triceps                                    5                      4                                                     5                      4+                Mobility Assessment: C/S joint mobility WFL C7-C2, mild-mod pain with testing and reports of L arm pain down the the elbow but not along any specific dermatome. Neurological: Reflexes / Sensations: non specific tingling on the L arm down to hand. Denies headache. Balance WNL.       Special Tests: Cervical Distraction: pos decr neck pain only                       Cervical Compression: pos incr neck pain only                          Spurling Test: pos                        TREATMENT/INTERVENTION:  Modalities (Rationale): to decrease pain and muscle tension  MHP to neck for 5 minutes in supine at end of session; no skin irritation noted     Therapeutic Exercises: to develop strength, endurance, range of motion, and flexibility  Initiated HEP of: UT stretch; levator scap stretch; posterior shoulder rolls; scapular squeezes  Added to HEP 2020: child's pose (forward and side to side); angry cat stretch; cervical nodding; LTR; supine piriformis stretch    Exercises in BOLD performed this date:     UBE/LBE: 5 minutes, level 1 for warm-up    Seated:  Upper trap stretch: 2 reps with 30 sec holds  Levator stretch: 2 reps with 30 sec holds  Cervical noddin reps B  Posterior shoulder rolls: 10 reps  Scapular squeezes: 10 reps  Triceps stretch: 30 sec holds  Posterior capsule stretch: 30 sec holds  Lower cervical/upper thoracic stretch: 3 reps with 15 sec holds  Cervical rotation self mob with towel: 5 reps B  Cervical extension self mob with towel: 10 reps  Forward flexion over blue physioball: 10 reps; 5 reps side to side  Trunk extension stretch: 10 reps   Hamstring stretch: 30 sec holds B  Piriformis stretch: 30 sec holds B     Supine:   LTR: 10 reps  SKTC: 3 reps with 5 sec holds B  Piriformis stretch: 30 sec holds B  Figure 4 stretch: 30 sec holds  Hooklying marches: 4 reps B  Hamstring stretch: 30 sec holds B (PROM)  Rolling out upper back over blue foam: 30 seconds     Quadruped:   Child's pose: 30 sec holds (forward and side to side)  Angry cat stretch: 8 reps    Standing: Self STM to left UT, rhomboids, infraspinatus with tennis ball  Self STM to bilateral thoracic and lumbar paraspinals with tennis ball   Shoulder flexion to 90 degrees, 2#: 10 reps  Overhead press 2#: 10 reps   Rows with blue TB: 2 sets of 10 reps  T's with blue TB: 10 reps   Triceps pull downs with green TB: 10 reps     Sidelying:   Open book stretch: 5 reps B  Lumbar rotation stretch: 30 sec holds B    Manual Therapy: for joint mobilization/manipulations and soft tissue mobilization  STM to bilateral UT, levator scapulae, and cervical paraspinals  Instrument assisted soft tissue mobilization to left UT and levator scapulae. Patient educated on purpose and affect of intervention with good understanding verbalized. Manual cervical traction in supine x3 with 30 sec holds  Cervical spine rotation and SB PROM  STM to bilateral lumbar paraspinals in prone  Lumbar gapping in sidelying; cavitation and pain relief  PA mobs to lumbar spine, grade 1,2      Neuro Re-Education: to improve movement, balance, coordination, kinesthetic sense, posture, and proprioception for sitting or standing balance  Educated on importance of upright posture on muscle balance and decreasing strain of certain muscles when sitting at a computer at work. Patient demonstrated understanding. Educated on towel roll for cervical support when sleeping     Activity tolerance and post treatment pain report:   Good  Pain Out: 3/10 neck; 2/10 back    Education:  Education was provided to the patient on the following topics: continue HEP in pain free ROM. []    No changes were made to the home exercise program.  [x]    The following changes were made to the home exercise program: provided with blue TB for rows and T's  Patient verbalized understanding of the topics presented. ASSESSMENT:   Patient continues to progress with physical therapy. Patient presents with decreased pain in neck and low back. Left sided neck pain remains chief complaint. He remains most tender at left UT and levator scapulae. Improvement in sleeping quality and ability to carry out ADLs and work duties. Patient with excellent adherence to HEP at home and work. Patient tolerated clinic exercises and manual therapy well this date. Patients progression toward goals is as follows:  [x]     Improving appropriately and progressing toward goals  []     Improving slowly and progressing toward goals  []     Not making progress toward goals and plan of care will be adjusted    PLAN OF CARE:   Patient continues to benefit from skilled intervention to address the above impairments. [x]    Continue treatment per established plan of care.   []     Recommend the following changes to the plan of care    Recommendations/Intent for next treatment: progress shoulder and scapular strengthening     Edouard Uriarte, PT   Time Calculation: 54 mins  Patient Time in clinic:   Start Time: 1400   Stop Time: 9279

## 2021-02-04 NOTE — PROGRESS NOTES
[facility]  [address], [address] [facility]    OUTPATIENT physical Therapy discharge note      2/4/2021:  Patient will be discharged from physical therapy at this time. Criteria for termination of care:    []           Patient has plateaued  [x]           Patient has not returned to therapy  []           Patient has missed three or more visits without prior notification  []           Other    Patient was seen for 6 visits from 11/17/20 to 12/14/20. Please refer to the most recent progress note for the latest PT info available. If you need anything further faxed to you, please contact us at [phone].     Thank you for this referral.  Windy Holm, PT

## 2021-04-12 ENCOUNTER — PATIENT OUTREACH (OUTPATIENT)
Dept: CASE MANAGEMENT | Age: 46
End: 2021-04-12

## 2021-04-13 NOTE — PROGRESS NOTES
4/12/21  ACM attempted to follow up with patient for CCM assessment. Attempts to reach patient were unsuccessful. On final call a VM was left for patient with the following information: ACM contact information and reason for call. 4/13/21  ACM attempted to follow up with patient for CCM assessment. Attempts to reach patient were unsuccessful. On final call a VM was left for patient with the following information: ACM contact information and reason for call. AC will outreach again in 7 days. My Chart message sent.

## 2021-04-16 LAB — HEMOGLOBIN A1C: 10.2 %

## 2021-05-18 ENCOUNTER — PATIENT OUTREACH (OUTPATIENT)
Dept: CASE MANAGEMENT | Age: 46
End: 2021-05-18

## 2021-05-18 NOTE — LETTER
5/18/2021 12:24 PM 
 
Mr. Corby Mcwilliams [address] [id number] Dear Mr. Miya Morris: 
 
Dear Corby Mcwilliams My name is Reinaldo Ochoa RN, and I am a Ambulatory Care Manager who partners with Juanita Jerome MD to improve patients' health. I've been trying to reach you via phone to let you know that, as a member of your care team, I will work with other providers involved in your care, offer education for your specific health conditions, and connect you with more resources as needed. This program is designed to provide you with the opportunity to have a ([address] [facility]/[address]) care manager partner with you for the following situations: 
 
 1) if you come home from the hospital or emergency room 2) to help manage your disease 3) when you would like assistance coordinating services or appointments This added support is provided at no additional cost to you. My primary focus is to help you achieve specific goals and improve your health. Please call me at [phone] to further discuss how I can support your health care needs. Sincerely, Mercy Graves RN

## 2021-05-18 NOTE — PROGRESS NOTES
ACM attempted to follow up with patient for CCM assessment. Attempts to reach patient were unsuccessful. On final call a VM was left for patient with the following information: ACM contact information and reason for call. ACM will outreach again in 7 days. Part of my job is to follow up with patients who have chronic conditions to see how they are feeling, answer any questions they may have about their care and make sure they have a follow-up appointment to see their primary care doctor. My Chart message and letter sent.

## 2021-05-28 ENCOUNTER — PATIENT OUTREACH (OUTPATIENT)
Dept: CASE MANAGEMENT | Age: 46
End: 2021-05-28

## 2021-06-01 NOTE — PROGRESS NOTES
ACM attempted to follow up with patient for CCM assessment. Attempts to reach patient were unsuccessful. On final call a VM was left for patient with the following information: ACM contact information and reason for call. ACM will outreach again in 7 days. Part of my job is to follow up with patients who have chronic conditions to see how they are feeling, answer any questions they may have about their care and make sure they have a follow-up appointment to see their primary care doctor.

## 2021-06-14 ENCOUNTER — PATIENT OUTREACH (OUTPATIENT)
Dept: CASE MANAGEMENT | Age: 46
End: 2021-06-14

## 2021-06-14 NOTE — PROGRESS NOTES
CM attempted to follow up with patient for CCM assessment. Attempts to reach patient were unsuccessful. On final call a VM was left for patient with the following information: ACM contact information and reason for call. Message left with mother, who says he may have another doctor. ACM left contact information for patient to return call with mother who says she has heard from him and will give him the message.

## 2021-06-30 ENCOUNTER — PATIENT OUTREACH (OUTPATIENT)
Dept: CASE MANAGEMENT | Age: 46
End: 2021-06-30

## 2021-07-01 NOTE — PROGRESS NOTES
ACM attempted to follow up with patient after recent ED visit to provide education regarding discharge. Attempts to reach patient were unsuccessful. On final call a VM was left for patient with the following information:   ·  hotline number [phone],   · ACM supplied contact information. Episode of care resolved.

## 2021-07-13 ENCOUNTER — TELEPHONE (OUTPATIENT)
Dept: INTERNAL MEDICINE CLINIC | Age: 46
End: 2021-07-13

## 2022-01-24 RX ORDER — GUAIFENESIN 100 MG/5ML
LIQUID (ML) ORAL
Qty: 90 TABLET | Refills: 3 | Status: SHIPPED | OUTPATIENT
Start: 2022-01-24

## 2022-02-16 ENCOUNTER — PATIENT OUTREACH (OUTPATIENT)
Dept: CASE MANAGEMENT | Age: 47
End: 2022-02-16

## 2022-02-16 NOTE — LETTER
2/17/2022 5:02 PM    Mr. Marilu Rosa  [address] [facility] [phone]        Hello Mr Heavenly Grant  My name is Nohemy Schulz. I am a Care Manager with [facility]. I often work with patients who could benefit from additional support understanding and managing their health. We are committed to providing you excellent care.     I have been unable to reach you on this at [phone]. Please contact me at [phone] if you would like additional help with community resources.  We appreciate the confidence you've shown by selecting us to provide your healthcare needs and I look forward to hearing from you soon.      [facility],  Nohemy Schulz, BSN, RN - Ambulatory - [phone]

## 2022-02-17 NOTE — PROGRESS NOTES
Ambulatory Care Management Note    Date/Time:  2/17/2022 5:00 PM    This patient was received as a referral from  [address] [place] [address] for case management services. Multiple unsuccessful attempts have been made to contact patient. Ambulatory Care Management get in touch letter mailed to the patient's address on file. No further outreach attempts are scheduled by [facility] at this time.      Patient has Ambulatory Care Manager's contact number for any questions or concerns in the future.  /dla

## 2022-03-03 ENCOUNTER — PATIENT OUTREACH (OUTPATIENT)
Dept: CASE MANAGEMENT | Age: 47
End: 2022-03-03

## 2022-03-03 NOTE — PROGRESS NOTES
Ambulatory Care Management Note     Date/Time:    3/3/2022 2:15 PM     This patient was received as a referral from  [facility] for case management services. Multiple unsuccessful attempts have been made to contact patient. Ambulatory Care Management get in touch letter mailed to the patient's address on file w/no response to date.    Complex Case Mgmt episode resolved at this time.   /dla

## 2022-03-18 PROBLEM — Z86.69 HISTORY OF SEIZURE DISORDER: Status: ACTIVE | Noted: 2018-01-10

## 2022-03-19 PROBLEM — F17.200 SMOKER: Status: ACTIVE | Noted: 2019-04-15

## 2022-06-19 ENCOUNTER — APPOINTMENT (OUTPATIENT)
Dept: GENERAL RADIOLOGY | Age: 47
End: 2022-06-19
Attending: NURSE PRACTITIONER
Payer: COMMERCIAL

## 2022-06-19 ENCOUNTER — HOSPITAL ENCOUNTER (EMERGENCY)
Age: 47
Discharge: HOME | End: 2022-06-19
Attending: EMERGENCY MEDICINE
Payer: COMMERCIAL

## 2022-06-19 VITALS
SYSTOLIC BLOOD PRESSURE: 135 MMHG | DIASTOLIC BLOOD PRESSURE: 93 MMHG | WEIGHT: 175 LBS | OXYGEN SATURATION: 97 % | HEIGHT: 69 IN | TEMPERATURE: 98 F | HEART RATE: 106 BPM | BODY MASS INDEX: 25.92 KG/M2 | RESPIRATION RATE: 18 BRPM

## 2022-06-19 DIAGNOSIS — Z20.822 ENCOUNTER FOR LABORATORY TESTING FOR COVID-19 VIRUS: Primary | ICD-10-CM

## 2022-06-19 DIAGNOSIS — R19.7 DIARRHEA, UNSPECIFIED TYPE: ICD-10-CM

## 2022-06-19 DIAGNOSIS — J06.9 ACUTE UPPER RESPIRATORY INFECTION: ICD-10-CM

## 2022-06-19 DIAGNOSIS — E87.1 HYPONATREMIA: ICD-10-CM

## 2022-06-19 LAB
ALBUMIN SERPL-MCNC: 4.4 G/DL (ref 3.5–5)
ALBUMIN/GLOB SERPL: 1.2 {RATIO} (ref 1.1–2.2)
ALP SERPL-CCNC: 103 U/L (ref 45–117)
ALT SERPL-CCNC: 40 U/L (ref 12–78)
ANION GAP SERPL CALC-SCNC: 6 MMOL/L (ref 5–15)
AST SERPL-CCNC: 32 U/L (ref 15–37)
BASOPHILS # BLD: 0.1 K/UL (ref 0–0.1)
BASOPHILS NFR BLD: 2 % (ref 0–1)
BILIRUB SERPL-MCNC: 0.4 MG/DL (ref 0.2–1)
BUN SERPL-MCNC: 10 MG/DL (ref 6–20)
BUN/CREAT SERPL: 9 (ref 12–20)
CALCIUM SERPL-MCNC: 9.2 MG/DL (ref 8.5–10.1)
CHLORIDE SERPL-SCNC: 92 MMOL/L (ref 97–108)
CO2 SERPL-SCNC: 30 MMOL/L (ref 21–32)
CREAT SERPL-MCNC: 1.1 MG/DL (ref 0.7–1.3)
DIFFERENTIAL METHOD BLD: ABNORMAL
EOSINOPHIL # BLD: 0 K/UL (ref 0–0.4)
EOSINOPHIL NFR BLD: 0 % (ref 0–7)
ERYTHROCYTE [DISTWIDTH] IN BLOOD BY AUTOMATED COUNT: 12.4 % (ref 11.5–14.5)
GLOBULIN SER CALC-MCNC: 3.7 G/DL (ref 2–4)
GLUCOSE SERPL-MCNC: 177 MG/DL (ref 65–100)
HCT VFR BLD AUTO: 45.1 % (ref 36.6–50.3)
HGB BLD-MCNC: 15.7 G/DL (ref 12.1–17)
IMM GRANULOCYTES # BLD AUTO: 0 K/UL (ref 0–0.04)
IMM GRANULOCYTES NFR BLD AUTO: 0 % (ref 0–0.5)
LYMPHOCYTES # BLD: 0.7 K/UL (ref 0.8–3.5)
LYMPHOCYTES NFR BLD: 15 % (ref 12–49)
MCH RBC QN AUTO: 32.6 PG (ref 26–34)
MCHC RBC AUTO-ENTMCNC: 34.8 G/DL (ref 30–36.5)
MCV RBC AUTO: 93.8 FL (ref 80–99)
MONOCYTES # BLD: 1.3 K/UL (ref 0–1)
MONOCYTES NFR BLD: 27 % (ref 5–13)
NEUTS SEG # BLD: 2.7 K/UL (ref 1.8–8)
NEUTS SEG NFR BLD: 56 % (ref 32–75)
NRBC # BLD: 0 K/UL (ref 0–0.01)
NRBC BLD-RTO: 0 PER 100 WBC
PLATELET # BLD AUTO: 224 K/UL (ref 150–400)
PMV BLD AUTO: 10.2 FL (ref 8.9–12.9)
POTASSIUM SERPL-SCNC: 3.6 MMOL/L (ref 3.5–5.1)
PROCALCITONIN SERPL-MCNC: <0.05 NG/ML
PROT SERPL-MCNC: 8.1 G/DL (ref 6.4–8.2)
RBC # BLD AUTO: 4.81 M/UL (ref 4.1–5.7)
RBC MORPH BLD: ABNORMAL
SARS-COV-2, COV2: NORMAL
SARS-COV-2, XPLCVT: DETECTED
SODIUM SERPL-SCNC: 128 MMOL/L (ref 136–145)
SOURCE, COVRS: ABNORMAL
WBC # BLD AUTO: 4.8 K/UL (ref 4.1–11.1)

## 2022-06-19 PROCEDURE — 84145 PROCALCITONIN (PCT): CPT

## 2022-06-19 PROCEDURE — 71046 X-RAY EXAM CHEST 2 VIEWS: CPT

## 2022-06-19 PROCEDURE — 96361 HYDRATE IV INFUSION ADD-ON: CPT

## 2022-06-19 PROCEDURE — 85025 COMPLETE CBC W/AUTO DIFF WBC: CPT

## 2022-06-19 PROCEDURE — 96374 THER/PROPH/DIAG INJ IV PUSH: CPT

## 2022-06-19 PROCEDURE — U0005 INFEC AGEN DETEC AMPLI PROBE: HCPCS

## 2022-06-19 PROCEDURE — 80053 COMPREHEN METABOLIC PANEL: CPT

## 2022-06-19 PROCEDURE — 74011250636 HC RX REV CODE- 250/636: Performed by: NURSE PRACTITIONER

## 2022-06-19 PROCEDURE — 99284 EMERGENCY DEPT VISIT MOD MDM: CPT

## 2022-06-19 PROCEDURE — 74011250637 HC RX REV CODE- 250/637: Performed by: NURSE PRACTITIONER

## 2022-06-19 PROCEDURE — 96375 TX/PRO/DX INJ NEW DRUG ADDON: CPT

## 2022-06-19 PROCEDURE — 36415 COLL VENOUS BLD VENIPUNCTURE: CPT

## 2022-06-19 RX ORDER — ONDANSETRON 4 MG/1
4 TABLET, ORALLY DISINTEGRATING ORAL
Qty: 20 TABLET | Refills: 0 | Status: SHIPPED | OUTPATIENT
Start: 2022-06-19

## 2022-06-19 RX ORDER — IBUPROFEN 800 MG/1
800 TABLET ORAL
Qty: 20 TABLET | Refills: 0 | Status: SHIPPED | OUTPATIENT
Start: 2022-06-19 | End: 2022-06-26

## 2022-06-19 RX ORDER — DEXAMETHASONE SODIUM PHOSPHATE 10 MG/ML
10 INJECTION INTRAMUSCULAR; INTRAVENOUS
Status: COMPLETED | OUTPATIENT
Start: 2022-06-19 | End: 2022-06-19

## 2022-06-19 RX ORDER — BUTALBITAL, ACETAMINOPHEN AND CAFFEINE 50; 325; 40 MG/1; MG/1; MG/1
2 TABLET ORAL
Status: COMPLETED | OUTPATIENT
Start: 2022-06-19 | End: 2022-06-19

## 2022-06-19 RX ORDER — KETOROLAC TROMETHAMINE 30 MG/ML
30 INJECTION, SOLUTION INTRAMUSCULAR; INTRAVENOUS
Status: COMPLETED | OUTPATIENT
Start: 2022-06-19 | End: 2022-06-19

## 2022-06-19 RX ORDER — ONDANSETRON 2 MG/ML
4 INJECTION INTRAMUSCULAR; INTRAVENOUS
Status: COMPLETED | OUTPATIENT
Start: 2022-06-19 | End: 2022-06-19

## 2022-06-19 RX ADMIN — ONDANSETRON HYDROCHLORIDE 4 MG: 2 SOLUTION INTRAMUSCULAR; INTRAVENOUS at 01:36

## 2022-06-19 RX ADMIN — DEXAMETHASONE SODIUM PHOSPHATE 10 MG: 10 INJECTION, SOLUTION INTRAMUSCULAR; INTRAVENOUS at 01:36

## 2022-06-19 RX ADMIN — SODIUM CHLORIDE 1000 ML: 9 INJECTION, SOLUTION INTRAVENOUS at 01:36

## 2022-06-19 RX ADMIN — KETOROLAC TROMETHAMINE 30 MG: 30 INJECTION, SOLUTION INTRAMUSCULAR; INTRAVENOUS at 01:36

## 2022-06-19 RX ADMIN — BUTALBITAL, ACETAMINOPHEN, AND CAFFEINE 2 TABLET: 50; 325; 40 TABLET ORAL at 02:42

## 2022-06-19 NOTE — DISCHARGE INSTRUCTIONS
There is a high suspicion that you have COVID-19. You presented to the ED with symptoms upper respiratory infection/viral infection without severe symptoms. Take Tylenol and or Motrin for pain. Increase your fluid intake. Your Covid swab is  pending and results will be available in 24 to 72 hours. Follow CDC guidelines for isolation until your infection status is known. Please review Covid information in your discharge paperwork for general Covid information as well as isolation recommendations. You may go to [employer] online to monitor your test results. Please increase your salt intake, monitor your glucose levels closely. Use your inhaler as needed for cough and shortness of breath. If you already have a MyChart, visit [facility] and click Log in to [facility] to view any test results. If you don't have a MyChart, you can register online by visiting [facility] and clicking Sign up for [facility]oxy.

## 2022-06-19 NOTE — Clinical Note
[address]  [address] [facility] [address]  [phone]    Work/School Note    Date: 6/19/2022     To Whom It May concern:    Princess Chan was evaluated by the following provider(s):  Attending Provider: Rich Anderson DO  Nurse Practitioner: Aroldo Bonds NP.   COVID19 virus is suspected. Per the CDC guidelines we recommend home isolation until the following conditions are all met:    1. At least five days have passed since symptoms first appeared and/or had a close exposure,   2. After home isolation for five days, wearing a mask around others for the next five days,  3. At least 24 have passed since last fever without the use of fever-reducing medications and  4.  Symptoms (eg cough, shortness of breath) have improved      Sincerely,          Melanie Darby NP

## 2022-06-19 NOTE — ED TRIAGE NOTES
Patient reports symptoms began last night, states that he is a  and may have been exposed to [employer], has headache, body aches, nasal congestion, and cough. Patient is COVID and Flu immunized.

## 2022-06-19 NOTE — PROGRESS NOTES
I called and left a voicemail for the patient or patient's parent concerning lab results and instructed them to call back the provided number for results. I also advised that the patient's results were available to view via [employer] and to call the provided number if there are any questions.

## 2022-06-19 NOTE — ED PROVIDER NOTES
HPI   Lianet Thomas is a 40 y.o. male with Hx of asthma, DMII, kidney stones, seizures who presents ambulatory by himself to [facility] ED with cc of multiple concerns. Patient reports abrupt onset of nausea, dizziness, body aches, headaches, SOB, cough, congestion, diarrhea approximately 1.5 days ago. Denies any known COVID exposures, however has a lot of interface with other people. 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 Has received both his COVID and flu vaccines. Took OTC medications PTA WNR.     (+) tobacco abuse, denies substance/ ETOH abuse. Has inhaler at home, not using. Denies vomiting,  cough, congestion, CP, dysuria, urinary frequency/hesitancy, flank pain, rashes, visual/speech/gait changes. PCP: Libra Stephens DO    There are no other complaints, changes or physical findings at this time. Past Medical History:   Diagnosis Date    Asthma     Diabetes ([address]     Kidney stones     [facility]ures ([facility])        Past Surgical History:   Procedure Laterality Date    HX OTHER SURGICAL      adenoidectomy         Family History:   Problem Relation Age of Onset    Stroke Mother     Heart Disease Mother     Diabetes Mother     High Cholesterol Mother     Hypertension Mother     Thyroid Disease Mother     Hypertension Father     High Cholesterol Father     Diabetes Maternal Grandmother     Hypertension Maternal Grandmother     OSTEOARTHRITIS Maternal Grandmother        Social History     Socioeconomic History    Marital status: SINGLE     Spouse name: Not on file    Number of children: Not on file    Years of education: Not on file    Highest education level: Not on file   Occupational History    Not on file   Tobacco Use    Smoking status: Current Every Day Smoker     Packs/day: 0.25     Years: 15.00     Pack years: 3.75     Types: Cigarettes    Smokeless tobacco: Current User   Substance and Sexual Activity    Alcohol use: Yes     Comment: seldomly    Drug use:  No Types: Marijuana    Sexual activity: Yes     Partners: Female   Other Topics Concern    Not on file   Social History Narrative    Not on file     Social Determinants of Health     Financial Resource Strain:     Difficulty of Paying Living Expenses: Not on file   Food Insecurity:     Worried About Running Out of Food in the Last Year: Not on file    Remy of Food in the Last Year: Not on file   Transportation Needs:     Lack of Transportation (Medical): Not on file    Lack of Transportation (Non-Medical): Not on file   Physical Activity:     Days of Exercise per Week: Not on file    Minutes of Exercise per Session: Not on file   Stress:     Feeling of Stress : Not on file   Social Connections:     Frequency of Communication with Friends and Family: Not on file    Frequency of Social Gatherings with Friends and Family: Not on file    Attends Buddhist Services: Not on file    Active Member of [address] [place] [address] [employer] or Organizations: Not on file    Attends Club or Organization Meetings: Not on file    Marital Status: Not on file   Intimate Partner Violence:     Fear of Current or Ex-Partner: Not on file    Emotionally Abused: Not on file    Physically Abused: Not on file    Sexually Abused: Not on file   Housing Stability:     Unable to Pay for Housing in the Last Year: Not on file    Number of [place] in the Last Year: Not on file    Unstable Housing in the Last Year: Not on file         ALLERGIES: Dilantin infatabs [phenytoin]    Review of Systems   Constitutional: Negative for activity change, appetite change, chills and fever. HENT: Positive for congestion and rhinorrhea. Negative for sinus pressure and sore throat. Eyes: Negative for visual disturbance. Respiratory: Positive for cough. Negative for shortness of breath. Cardiovascular: Negative for chest pain. Gastrointestinal: Positive for diarrhea and nausea. Negative for abdominal pain and vomiting.    Genitourinary: Negative for dysuria, flank pain, frequency and urgency. Musculoskeletal: Positive for arthralgias and myalgias. Negative for back pain, gait problem, joint swelling and neck pain. Skin: Negative for color change and rash. Neurological: Positive for dizziness and headaches. Negative for speech difficulty, weakness, light-headedness and numbness. Psychiatric/Behavioral: Negative for agitation, behavioral problems and confusion. All other systems reviewed and are negative. Vitals:    06/19/22 0101   BP: (!) 135/93   Pulse: (!) 106   Resp: 18   Temp: 98 °F (36.7 °C)   SpO2: 97%   Weight: 79.4 kg (175 lb)   Height: 5' 9\" (1.753 m)            Physical Exam  Vitals and nursing note reviewed. Constitutional:       General: He is not in acute distress. Appearance: He is well-developed. HENT:      Head: Normocephalic and atraumatic. Right Ear: External ear normal.      Left Ear: External ear normal.      Mouth/Throat:      Pharynx: No oropharyngeal exudate. Eyes:      Conjunctiva/sclera: Conjunctivae normal.      Pupils: Pupils are equal, round, and reactive to light. Cardiovascular:      Rate and Rhythm: Normal rate and regular rhythm. Heart sounds: Normal heart sounds. Pulmonary:      Effort: Pulmonary effort is normal.      Breath sounds: Normal breath sounds. Abdominal:      Palpations: Abdomen is soft. Tenderness: There is no abdominal tenderness. There is no guarding or rebound. Musculoskeletal:         General: Normal range of motion. Cervical back: Normal range of motion and neck supple. Skin:     General: Skin is warm and dry. Neurological:      Mental Status: He is alert and oriented to person, place, and time. Psychiatric:         Behavior: Behavior normal.         Thought Content:  Thought content normal.         Judgment: Judgment normal.          MDM  Number of Diagnoses or Management Options  Acute upper respiratory infection  Diarrhea, unspecified type  Encounter for laboratory testing for COVID-19 virus  Hyponatremia  Diagnosis management comments: DDx: dehydration, COVID, URI, PNA     Patient presents with multiple times that are consistent with likely COVID diagnosis. PCR pending. Chest x-ray normal, D-dimer negative. Improvement of symptoms after medication administration in the emergency department. Discussed management of symptoms at home. Incidentally DM is noted to be 128, with correction with glucose is approximately 130. Discussed management of sodium, including increased salt intake with things like electrolyte-based fluids. Discussed reasons to return to the emergency department. Discussed isolation measures. Amount and/or Complexity of Data Reviewed  Clinical lab tests: ordered and reviewed  Tests in the radiology section of CPT®: ordered and reviewed  Review and summarize past medical records: yes           Procedures      LABORATORY TESTS:  Recent Results (from the past 12 hour(s))   SARS-COV-2    Collection Time: 06/19/22  1:13 AM   Result Value Ref Range    SARS-CoV-2 by PCR Please find results under separate order     CBC WITH AUTOMATED DIFF    Collection Time: 06/19/22  1:14 AM   Result Value Ref Range    WBC 4.8 4.1 - 11.1 K/uL    RBC 4.81 4.10 - 5.70 M/uL    HGB 15.7 12.1 - 17.0 g/dL    HCT 45.1 36.6 - 50.3 %    MCV 93.8 80.0 - 99.0 FL    MCH 32.6 26.0 - 34.0 PG    MCHC 34.8 30.0 - 36.5 g/dL    RDW 12.4 11.5 - 14.5 %    PLATELET 179 025 - 918 K/uL    MPV 10.2 8.9 - 12.9 FL    NRBC 0.0 0  WBC    ABSOLUTE NRBC 0.00 0.00 - 0.01 K/uL    NEUTROPHILS 56 32 - 75 %    LYMPHOCYTES 15 12 - 49 %    MONOCYTES 27 (H) 5 - 13 %    EOSINOPHILS 0 0 - 7 %    BASOPHILS 2 (H) 0 - 1 %    IMMATURE GRANULOCYTES 0 0.0 - 0.5 %    ABS. NEUTROPHILS 2.7 1.8 - 8.0 K/UL    ABS. LYMPHOCYTES 0.7 (L) 0.8 - 3.5 K/UL    ABS. MONOCYTES 1.3 (H) 0.0 - 1.0 K/UL    ABS. EOSINOPHILS 0.0 0.0 - 0.4 K/UL    ABS. BASOPHILS 0.1 0.0 - 0.1 K/UL    ABS. IMM.  GRANS. 0.0 0.00 - 0.04 K/UL    DF SMEAR SCANNED      RBC COMMENTS NORMOCYTIC, NORMOCHROMIC     METABOLIC PANEL, COMPREHENSIVE    Collection Time: 06/19/22  1:14 AM   Result Value Ref Range    Sodium 128 (L) 136 - 145 mmol/L    Potassium 3.6 3.5 - 5.1 mmol/L    Chloride 92 (L) 97 - 108 mmol/L    CO2 30 21 - 32 mmol/L    Anion gap 6 5 - 15 mmol/L    Glucose 177 (H) 65 - 100 mg/dL    BUN 10 6 - 20 MG/DL    Creatinine 1.10 0.70 - 1.30 MG/DL    BUN/Creatinine ratio 9 (L) 12 - 20      GFR est AA >60 >60 ml/min/1.73m2    GFR est non-AA >60 >60 ml/min/1.73m2    Calcium 9.2 8.5 - 10.1 MG/DL    Bilirubin, total 0.4 0.2 - 1.0 MG/DL    ALT (SGPT) 40 12 - 78 U/L    AST (SGOT) 32 15 - 37 U/L    Alk. phosphatase 103 45 - 117 U/L    Protein, total 8.1 6.4 - 8.2 g/dL    Albumin 4.4 3.5 - 5.0 g/dL    Globulin 3.7 2.0 - 4.0 g/dL    A-G Ratio 1.2 1.1 - 2.2     PROCALCITONIN    Collection Time: 06/19/22  1:14 AM   Result Value Ref Range    Procalcitonin <0.05 ng/mL       IMAGING RESULTS:  XR CHEST PA LAT   Final Result      No acute process. MEDICATIONS GIVEN:  Medications   sodium chloride 0.9 % bolus infusion 1,000 mL (1,000 mL IntraVENous New Bag 6/19/22 0136)   ondansetron (ZOFRAN) injection 4 mg (4 mg IntraVENous Given 6/19/22 0136)   ketorolac (TORADOL) injection 30 mg (30 mg IntraVENous Given 6/19/22 0136)   dexamethasone (PF) (DECADRON) 10 mg/mL injection 10 mg (10 mg IntraVENous Given 6/19/22 0136)   butalbital-acetaminophen-caffeine (FIORICET, ESGIC) -40 mg per tablet 2 Tablet (2 Tablets Oral Given 6/19/22 0242)       IMPRESSION:  1. Encounter for laboratory testing for COVID-19 virus    2. Hyponatremia    3. Acute upper respiratory infection    4. Diarrhea, unspecified type        PLAN:  1. Current Discharge Medication List      START taking these medications    Details   ondansetron (ZOFRAN ODT) 4 mg disintegrating tablet Take 1 Tablet by mouth every eight (8) hours as needed for Nausea or Vomiting.   Qty: 20 Tablet, Refills: 0  Start date: 6/19/2022      ibuprofen (MOTRIN) 800 mg tablet Take 1 Tablet by mouth every six (6) hours as needed for Pain for up to 7 days. Qty: 20 Tablet, Refills: 0  Start date: 6/19/2022, End date: 6/26/2022           2. Follow-up Information     Follow up With Specialties Details Why [address] [place] [address], [facility] Susi Solomon DO Family Medicine Schedule an appointment as soon as possible for a visit   [address] [place] [address] Drive [address] [phone]      [facility] EMERGENCY DEP Emergency Medicine Go to  As needed, If symptoms worsen [address] [facility]  [phone]        3.  Return to ED if worse

## 2022-06-20 ENCOUNTER — PATIENT OUTREACH (OUTPATIENT)
Dept: CASE MANAGEMENT | Age: 47
End: 2022-06-20

## 2022-06-20 NOTE — PROGRESS NOTES
Per chart review, the patient has seen the positive covid result in the results section of [employer].   I called and left message for patient to call back concerning viewed results with any questions or concerns

## 2022-11-01 ENCOUNTER — HOSPITAL ENCOUNTER (EMERGENCY)
Age: 47
Discharge: HOME | End: 2022-11-01
Attending: EMERGENCY MEDICINE
Payer: COMMERCIAL

## 2022-11-01 ENCOUNTER — APPOINTMENT (OUTPATIENT)
Dept: GENERAL RADIOLOGY | Age: 47
End: 2022-11-01
Attending: EMERGENCY MEDICINE
Payer: COMMERCIAL

## 2022-11-01 VITALS
TEMPERATURE: 98.1 F | HEART RATE: 93 BPM | DIASTOLIC BLOOD PRESSURE: 85 MMHG | RESPIRATION RATE: 20 BRPM | OXYGEN SATURATION: 97 % | SYSTOLIC BLOOD PRESSURE: 127 MMHG

## 2022-11-01 DIAGNOSIS — R07.9 CHEST PAIN, UNSPECIFIED TYPE: Primary | ICD-10-CM

## 2022-11-01 LAB
ALBUMIN SERPL-MCNC: 3.9 G/DL (ref 3.5–5)
ALBUMIN/GLOB SERPL: 1.1 {RATIO} (ref 1.1–2.2)
ALP SERPL-CCNC: 88 U/L (ref 45–117)
ALT SERPL-CCNC: 39 U/L (ref 12–78)
ANION GAP SERPL CALC-SCNC: 4 MMOL/L (ref 5–15)
AST SERPL-CCNC: 20 U/L (ref 15–37)
ATRIAL RATE: 78 BPM
BASOPHILS # BLD: 0.1 K/UL (ref 0–0.1)
BASOPHILS NFR BLD: 2 % (ref 0–1)
BILIRUB SERPL-MCNC: 0.4 MG/DL (ref 0.2–1)
BUN SERPL-MCNC: 11 MG/DL (ref 6–20)
BUN/CREAT SERPL: 14 (ref 12–20)
CALCIUM SERPL-MCNC: 9 MG/DL (ref 8.5–10.1)
CALCULATED P AXIS, ECG09: 51 DEGREES
CALCULATED R AXIS, ECG10: 81 DEGREES
CALCULATED T AXIS, ECG11: 29 DEGREES
CHLORIDE SERPL-SCNC: 108 MMOL/L (ref 97–108)
CO2 SERPL-SCNC: 27 MMOL/L (ref 21–32)
COMMENT, HOLDF: NORMAL
CREAT SERPL-MCNC: 0.76 MG/DL (ref 0.7–1.3)
DIAGNOSIS, 93000: NORMAL
DIFFERENTIAL METHOD BLD: ABNORMAL
EOSINOPHIL # BLD: 0.3 K/UL (ref 0–0.4)
EOSINOPHIL NFR BLD: 5 % (ref 0–7)
ERYTHROCYTE [DISTWIDTH] IN BLOOD BY AUTOMATED COUNT: 13.1 % (ref 11.5–14.5)
GLOBULIN SER CALC-MCNC: 3.7 G/DL (ref 2–4)
GLUCOSE SERPL-MCNC: 99 MG/DL (ref 65–100)
HCT VFR BLD AUTO: 46.5 % (ref 36.6–50.3)
HGB BLD-MCNC: 15.4 G/DL (ref 12.1–17)
IMM GRANULOCYTES # BLD AUTO: 0 K/UL (ref 0–0.04)
IMM GRANULOCYTES NFR BLD AUTO: 0 % (ref 0–0.5)
LIPASE SERPL-CCNC: 181 U/L (ref 73–393)
LYMPHOCYTES # BLD: 1.8 K/UL (ref 0.8–3.5)
LYMPHOCYTES NFR BLD: 31 % (ref 12–49)
MCH RBC QN AUTO: 32.4 PG (ref 26–34)
MCHC RBC AUTO-ENTMCNC: 33.1 G/DL (ref 30–36.5)
MCV RBC AUTO: 97.9 FL (ref 80–99)
MONOCYTES # BLD: 0.5 K/UL (ref 0–1)
MONOCYTES NFR BLD: 9 % (ref 5–13)
NEUTS SEG # BLD: 3.2 K/UL (ref 1.8–8)
NEUTS SEG NFR BLD: 53 % (ref 32–75)
NRBC # BLD: 0 K/UL (ref 0–0.01)
NRBC BLD-RTO: 0 PER 100 WBC
P-R INTERVAL, ECG05: 148 MS
PLATELET # BLD AUTO: 247 K/UL (ref 150–400)
PMV BLD AUTO: 11.6 FL (ref 8.9–12.9)
POTASSIUM SERPL-SCNC: 4.1 MMOL/L (ref 3.5–5.1)
PROT SERPL-MCNC: 7.6 G/DL (ref 6.4–8.2)
Q-T INTERVAL, ECG07: 362 MS
QRS DURATION, ECG06: 92 MS
QTC CALCULATION (BEZET), ECG08: 412 MS
RBC # BLD AUTO: 4.75 M/UL (ref 4.1–5.7)
RBC MORPH BLD: ABNORMAL
SAMPLES BEING HELD,HOLD: NORMAL
SODIUM SERPL-SCNC: 139 MMOL/L (ref 136–145)
TROPONIN-HIGH SENSITIVITY: 5 NG/L (ref 0–76)
VENTRICULAR RATE, ECG03: 78 BPM
WBC # BLD AUTO: 5.9 K/UL (ref 4.1–11.1)

## 2022-11-01 PROCEDURE — 84484 ASSAY OF TROPONIN QUANT: CPT

## 2022-11-01 PROCEDURE — 83690 ASSAY OF LIPASE: CPT

## 2022-11-01 PROCEDURE — 80053 COMPREHEN METABOLIC PANEL: CPT

## 2022-11-01 PROCEDURE — 96374 THER/PROPH/DIAG INJ IV PUSH: CPT

## 2022-11-01 PROCEDURE — 74011250636 HC RX REV CODE- 250/636: Performed by: EMERGENCY MEDICINE

## 2022-11-01 PROCEDURE — 71046 X-RAY EXAM CHEST 2 VIEWS: CPT

## 2022-11-01 PROCEDURE — 99285 EMERGENCY DEPT VISIT HI MDM: CPT

## 2022-11-01 PROCEDURE — 36415 COLL VENOUS BLD VENIPUNCTURE: CPT

## 2022-11-01 PROCEDURE — 85025 COMPLETE CBC W/AUTO DIFF WBC: CPT

## 2022-11-01 PROCEDURE — 74011000250 HC RX REV CODE- 250: Performed by: EMERGENCY MEDICINE

## 2022-11-01 PROCEDURE — 93005 ELECTROCARDIOGRAM TRACING: CPT

## 2022-11-01 RX ORDER — LIDOCAINE 4 G/100G
1 PATCH TOPICAL
Status: DISCONTINUED | OUTPATIENT
Start: 2022-11-01 | End: 2022-11-01 | Stop reason: HOSPADM

## 2022-11-01 RX ORDER — KETOROLAC TROMETHAMINE 30 MG/ML
15 INJECTION, SOLUTION INTRAMUSCULAR; INTRAVENOUS
Status: COMPLETED | OUTPATIENT
Start: 2022-11-01 | End: 2022-11-01

## 2022-11-01 RX ADMIN — KETOROLAC TROMETHAMINE 15 MG: 30 INJECTION, SOLUTION INTRAMUSCULAR at 14:11

## 2022-11-01 NOTE — Clinical Note
[address]  [address] [facility] [address]  [phone]    Work/School Note    Date: 11/1/2022    To Whom It May concern:    Enid Wilson was seen and treated today in the emergency room by the following provider(s):  Attending Provider: Rg Montenegro MD  Nurse Practitioner: Nikolai Campos NP.      Enid Wilson is excused from work/school on 11/1/2022 through 11/3/2022. He is medically clear to return to work/school on 11/4/2022.          Sincerely,          Clair Camacho NP

## 2022-11-01 NOTE — ED PROVIDER NOTES
Chest Pain (Angina)   Associated symptoms include cough and shortness of breath. Pertinent negatives include no abdominal pain, no back pain, no dizziness, no fever, no headaches, no nausea, no palpitations and no vomiting. Patient is a 67-year-old male with past medical history significant for asthma, type 2 diabetes, kidney stones and seizures who presents to the ED with right-sided posterior chest wall pain for 2 to 3 days. He denies any falls, direct injury or blunt trauma. He does do repetitive pushing, pulling and lifting with yard work and jobs. Denies fever, cold symptoms, headache, neck pain, visual changes, focal weakness or rash. Denies any difficulty breathing, difficulty swallowing or chest pain. Denies any nausea, vomiting or diarrhea.  Pt. Reports that he has not had any medications today prior to arrival.        Past Medical History:   Diagnosis Date    Asthma     Diabetes ([facility] [address]     Kidney stones     Seizures ([facility] [address]        Past Surgical History:   Procedure Laterality Date    HX OTHER SURGICAL      adenoidectomy         Family History:   Problem Relation Age of Onset    Stroke Mother     Heart Disease Mother     Diabetes Mother     High Cholesterol Mother     Hypertension Mother     Thyroid Disease Mother     Hypertension Father     High Cholesterol Father     Diabetes Maternal Grandmother     Hypertension Maternal Grandmother     OSTEOARTHRITIS Maternal Grandmother        Social History     Socioeconomic History    Marital status: SINGLE     Spouse name: Not on file    Number of children: Not on file    Years of education: Not on file    Highest education level: Not on file   Occupational History    Not on file   Tobacco Use    Smoking status: Every Day     Packs/day: 0.25     Years: 15.00     Pack years: 3.75     Types: Cigarettes    Smokeless tobacco: Current   Substance and Sexual Activity    Alcohol use: Yes     Comment: seldomly    Drug use: No     Types: Marijuana    Sexual activity: Yes Partners: Female   Other Topics Concern    Not on file   Social History Narrative    Not on file     Social Determinants of Health     Financial Resource Strain: Not on file   Food Insecurity: Not on file   Transportation Needs: Not on file   Physical Activity: Not on file   Stress: Not on file   Social Connections: Not on file   Intimate Partner Violence: Not on file   Housing Stability: Not on file         ALLERGIES: Dilantin infatabs [phenytoin]    Review of Systems   Constitutional:  Negative for activity change, appetite change, fever and unexpected weight change. HENT:  Negative for congestion and sore throat. Eyes:  Negative for visual disturbance. Respiratory:  Positive for cough and shortness of breath. Cardiovascular:  Positive for chest pain. Negative for palpitations and leg swelling. Gastrointestinal:  Negative for abdominal pain, diarrhea, nausea and vomiting. Genitourinary:  Negative for dysuria and flank pain. Musculoskeletal:  Negative for back pain, gait problem and neck pain. Skin:  Negative for rash. Neurological:  Negative for dizziness, light-headedness and headaches. All other systems reviewed and are negative. Vitals:    11/01/22 1140   BP: 137/89   Pulse: 82   Resp: 16   Temp: 97.1 °F (36.2 °C)   SpO2: 99%            Physical Exam  Vitals and nursing note reviewed. Constitutional:       General: He is not in acute distress. Appearance: He is well-developed and normal weight. He is not ill-appearing, toxic-appearing or diaphoretic. Comments: Black male; smoker; works several part time jobs   HENT:      Head: Normocephalic. Cardiovascular:      Rate and Rhythm: Normal rate and regular rhythm. Heart sounds: Normal heart sounds. Heart sounds not distant. Pulmonary:      Effort: Pulmonary effort is normal.      Breath sounds: Normal breath sounds. Chest:      Chest wall: Tenderness present.       Comments: Right-sided posterior lateral mid thoracic tenderness with palpation and position changes. Musculoskeletal:         General: Normal range of motion. Right lower leg: No tenderness. No edema. Left lower leg: No tenderness. No edema. Skin:     General: Skin is warm and dry. Findings: No ecchymosis, erythema or rash. Neurological:      General: No focal deficit present. Mental Status: He is alert and oriented to person, place, and time. Psychiatric:         Mood and Affect: Mood normal.         Behavior: Behavior normal.        [facility]    ED Course as of 11/01/22 1350   Tue Nov 01, 2022   1147 EKG  1143  Normal sinus rhythm at 70 bpm.  Normal axis. Normal intervals. No STEMI [GG]      ED Course User Index  [GG] Will Alonso DO       Procedures      XR CHEST PA LAT    Result Date: 11/1/2022  No acute findings. Labs Reviewed   CBC WITH AUTOMATED DIFF - Abnormal; Notable for the following components:       Result Value    BASOPHILS 2 (*)     All other components within normal limits   METABOLIC PANEL, COMPREHENSIVE - Abnormal; Notable for the following components:    Anion gap 4 (*)     All other components within normal limits   SAMPLES BEING HELD   LIPASE   TROPONIN-HIGH SENSITIVITY     Patient has been reexamined and denies any further complaints of pain or discomfort at this time. He was given referral for follow-up. 6:25 PM  Patient's results and plan of care have been reviewed with him. Patient has verbally conveyed his understanding and agreement of his signs, symptoms, diagnosis, treatment and prognosis and additionally agrees to follow up as recommended or return to the Emergency Room should his condition change prior to follow-up. Discharge instructions have also been provided to the patient with some educational information regarding his diagnosis as well a list of reasons why he would want to return to the ER prior to his follow-up appointment should his condition change. Alexys Alejo NP

## 2022-11-01 NOTE — ED TRIAGE NOTES
Right sided chest pain x 2 days, denies fever, +sob, +cough, worse with movement or deep breathing , denies n/v, small cramps in his chest